# Patient Record
Sex: FEMALE | Race: BLACK OR AFRICAN AMERICAN | Employment: OTHER | URBAN - METROPOLITAN AREA
[De-identification: names, ages, dates, MRNs, and addresses within clinical notes are randomized per-mention and may not be internally consistent; named-entity substitution may affect disease eponyms.]

---

## 2017-01-23 ENCOUNTER — OFFICE VISIT (OUTPATIENT)
Dept: FAMILY MEDICINE CLINIC | Age: 57
End: 2017-01-23

## 2017-01-23 VITALS
WEIGHT: 198 LBS | OXYGEN SATURATION: 99 % | HEIGHT: 66 IN | TEMPERATURE: 96.5 F | SYSTOLIC BLOOD PRESSURE: 126 MMHG | DIASTOLIC BLOOD PRESSURE: 80 MMHG | RESPIRATION RATE: 16 BRPM | BODY MASS INDEX: 31.82 KG/M2 | HEART RATE: 66 BPM

## 2017-01-23 DIAGNOSIS — F51.01 PRIMARY INSOMNIA: ICD-10-CM

## 2017-01-23 DIAGNOSIS — R73.02 IGT (IMPAIRED GLUCOSE TOLERANCE): ICD-10-CM

## 2017-01-23 DIAGNOSIS — I10 ESSENTIAL HYPERTENSION: Primary | ICD-10-CM

## 2017-01-23 DIAGNOSIS — Z51.81 ENCOUNTER FOR MEDICATION MONITORING: ICD-10-CM

## 2017-01-23 LAB — HBA1C MFR BLD HPLC: 5.4 %

## 2017-01-23 RX ORDER — ALPRAZOLAM 0.25 MG/1
TABLET ORAL
Qty: 30 TAB | Refills: 3 | Status: SHIPPED | OUTPATIENT
Start: 2017-01-23 | End: 2017-08-11 | Stop reason: SDUPTHER

## 2017-01-23 RX ORDER — LEVOFLOXACIN 500 MG/1
TABLET, FILM COATED ORAL
COMMUNITY
Start: 2016-12-13 | End: 2017-01-23 | Stop reason: ALTCHOICE

## 2017-01-23 NOTE — PROGRESS NOTES
Chief Complaint   Patient presents with    Hypertension     follow up       CMP 4/15/2016    Lipid 4/15/2016    A1C 4/15/2016    Mammogram 4/1/2015. Pt states she completed her breast reconstruction on 12/7/2016. Colonoscopy 5/20/2015 -by Dr. Bert Du repeat in 5 years.

## 2017-01-23 NOTE — PROGRESS NOTES
HISTORY OF PRESENT ILLNESS  Vicenta Elizabeth is a 55 y.o. female. HPI   Follow up on chronic medical problems. Follow up on BP. Overall feeling well. Hypertension Review:  Compliant w/ low salt diet, and some exercise. Tolerating Norvasc without problems. No home bp monitoring. No swelling, headache or dizziness. No chest pain, SOB, palpitations. Sleeping better with using the xanax. Glucose intolerance reveiw:  She has IGT. Last A1c level was 5.7% in April 2016. Diabetic ROS - further diabetic ROS: no polyuria or polydipsia, no chest pain, dyspnea or TIA's, no numbness, tingling or pain in extremities, no unusual visual symptoms. Lab review: orders written for new lab studies as appropriate; see orders. Recent b/l mastectomy for hx of breast cancer October 2015. Overall recovering well. Just finished her breast reconstruction on last month. Patient Active Problem List   Diagnosis Code    [facility] C[facility]    Breast cancer genetic susceptibility Z15.01    Hypovitaminosis D E55.9    Osteoporosis M81.0    Hypertension I10    Insomnia G47.00    HX: breast cancer Z85.3    Encounter for medication monitoring Z51.81    IGT (impaired glucose tolerance) R73.02       Current Outpatient Prescriptions   Medication Sig Dispense Refill    iron-vitamin C (VITRON-C) 65 mg iron- 125 mg TbEC Take 1 Tab by mouth daily.  docusate sodium (COLACE) 100 mg capsule TAKE 1 CAPSULE BY MOUTH TWICE A DAY AS NEEDED FOR CONSTIPATION  0    ALPRAZolam (XANAX) 0.25 mg tablet TAKE 1 TABLET BY MOUTH AT BEDTIME AS NEEDED FOR SLEEP 30 Tab 3    amLODIPine (NORVASC) 2.5 mg tablet TAKE 1 TABLET BY MOUTH EVERY DAY 30 Tab 11    acetaminophen (TYLENOL EXTRA STRENGTH) 500 mg tablet Take 1,000 mg by mouth every six (6) hours as needed for Pain.  mometasone (NASONEX) 50 mcg/actuation nasal spray 2 Sprays by Both Nostrils route daily. 1 Container 11    OTHER 5 mL daily.  Vitamin D 1,000 IU liquid daily  LORATADINE/PSEUDOEPHEDRINE SUL (CLARITIN-D 24 HOUR PO) Take 1 Tab by mouth as needed.  Calcium Carbonate-Vit D3-Min (CALTRATE 600+D PLUS MINERALS) 600-400 mg-unit Tab Take 1 Tab by mouth daily. Allergies   Allergen Reactions    Fentanyl Nausea Only    Neulasta [Pegfilgrastim] Other (comments)     Severe pain    Reglan [Metoclopramide] Other (comments)     Muscle tension    Sulfa (Sulfonamide Antibiotics) Nausea Only    Zofran [Ondansetron Hcl (Pf)] Itching       Past Medical History   Diagnosis Date    Breast cancer genetic susceptibility     Cancer [facility]) [facility]     right breast    Cancer ([facility] [address] 1994     reoccurence in the lymph nodes.  Cancer [facility]) 2008     left breast    Chemotherapy      2008 and 1994    Hypertension        Past Surgical History   Procedure Laterality Date    Hc stem cell trnspl autologous  1994    Endoscopy, colon, diagnostic  4/10     manetas. repeat in5 yrs.     Hx oophorectomy  1/28/2013    Hx breast lumpectomy  1994     right    Hx breast lumpectomy  2008     left breast    Hx mastectomy  10/5/2015     Bilateral    Hx breast reconstruction  10/5/2015    Hx breast reconstruction  12/07/2016     completed       Family History   Problem Relation Age of Onset    Heart Disease Mother     Hypertension Mother     No Known Problems Father     Diabetes Maternal Grandmother     No Known Problems Maternal Grandfather     No Known Problems Paternal Grandmother     No Known Problems Paternal Grandfather        Social History   Substance Use Topics    Smoking status: Never Smoker    Smokeless tobacco: Never Used    Alcohol use No        Lab Results  Component Value Date/Time   WBC 4.9 01/16/2015 11:10 AM   HGB 13.2 01/16/2015 11:10 AM   HCT 39.1 01/16/2015 11:10 AM   PLATELET 041 25/29/3294 11:10 AM   MCV 90 01/16/2015 11:10 AM       Lab Results  Component Value Date/Time   Cholesterol, total 206 04/15/2016 11:53 AM   HDL Cholesterol 104 04/15/2016 11:53 AM LDL, calculated 86 04/15/2016 11:53 AM   Triglyceride 80 04/15/2016 11:53 AM       Lab Results   Component Value Date/Time    Sodium 141 04/15/2016 11:53 AM    Potassium 4.6 04/15/2016 11:53 AM    Chloride 99 04/15/2016 11:53 AM    CO2 26 04/15/2016 11:53 AM    Glucose 82 04/15/2016 11:53 AM    BUN 16 04/15/2016 11:53 AM    Creatinine 0.66 04/15/2016 11:53 AM    BUN/Creatinine ratio 24 04/15/2016 11:53 AM    GFR est  04/15/2016 11:53 AM    GFR est non- 04/15/2016 11:53 AM    Calcium 9.6 04/15/2016 11:53 AM    Bilirubin, total 0.3 04/15/2016 11:53 AM    ALT 7 04/15/2016 11:53 AM    AST 12 04/15/2016 11:53 AM    Alk. phosphatase 84 04/15/2016 11:53 AM    Protein, total 7.4 04/15/2016 11:53 AM    Albumin 4.4 04/15/2016 11:53 AM    A-G Ratio 1.5 04/15/2016 11:53 AM      Lab Results   Component Value Date/Time    Hemoglobin A1c 5.9 02/25/2014 09:06 AM    Hemoglobin A1c (POC) 5.7 04/15/2016 11:53 AM         Review of Systems   Constitutional: Negative for malaise/fatigue. HENT: Negative for congestion. Eyes: Negative for blurred vision. Respiratory: Negative for cough and shortness of breath. Cardiovascular: Negative for chest pain, palpitations and leg swelling. Gastrointestinal: Negative for abdominal pain, constipation and heartburn. Genitourinary: Negative for dysuria, frequency and urgency. Musculoskeletal: Negative for back pain and joint pain. Neurological: Negative for dizziness, tingling and headaches. Endo/Heme/Allergies: Negative for environmental allergies. Psychiatric/Behavioral: Negative for depression. The patient does not have insomnia. Physical Exam   Constitutional: She appears well-developed and well-nourished.    /80 (BP 1 Location: Left arm, BP Patient Position: Sitting)  Pulse 66  Temp 96.5 °F (35.8 °C) (Oral)   Resp 16  Ht 5' 6\" (1.676 m)  Wt 198 lb (89.8 kg)  LMP 01/01/1994  SpO2 99%  BMI 31.96 kg/m2     HENT:   Right Ear: Tympanic membrane and ear canal normal.   Left Ear: Tympanic membrane and ear canal normal.   Nose: No mucosal edema or rhinorrhea. Mouth/Throat: Oropharynx is clear and moist and mucous membranes are normal.   Neck: Normal range of motion. Neck supple. No thyromegaly present. Cardiovascular: Normal rate and regular rhythm. No murmur heard. Pulmonary/Chest: Effort normal and breath sounds normal.   Abdominal: Soft. Bowel sounds are normal. There is no tenderness. Musculoskeletal: Normal range of motion. She exhibits no edema. Lymphadenopathy:     She has no cervical adenopathy. Skin: Skin is warm and dry. Psychiatric: She has a normal mood and affect. Nursing note and vitals reviewed. ASSESSMENT and PLAN  Becky Felton was seen today for hypertension. Diagnoses and all orders for this visit:    Essential hypertension  Stable     Primary insomnia  -     Refill ALPRAZolam (XANAX) 0.25 mg tablet; TAKE 1 TABLET BY MOUTH AT BEDTIME AS NEEDED FOR SLEEP    IGT (impaired glucose tolerance)  -     AMB POC HEMOGLOBIN A1C    Encounter for medication monitoring  -     METABOLIC PANEL, BASIC  -     AMB POC COMPLETE CBC, AUTOMATED      Follow-up Disposition:  Return in about 6 months (around 7/23/2017). reviewed diet, exercise and weight control  cardiovascular risk and specific lipid/LDL goals reviewed  reviewed medications and side effects in detail  specific diabetic recommendations: low cholesterol diet, weight control and daily exercise discussed and glycohemoglobin and other lab monitoring discussed     I have discussed diagnosis listed in this note with pt and/or family. I have discussed treatment plans and options and the risk/benefit analysis of those options, including safe use of medications and possible medication side effects. Through the use of shared decision making we have agreed to the above plan. The patient has received an after-visit summary and questions were answered concerning future plans and follow up.   Advise pt of any urgent changes then to proceed to the ER.

## 2017-01-23 NOTE — MR AVS SNAPSHOT
Visit Information Date & Time Provider Department Dept. Phone Encounter #  
 1/23/2017  2:00 PM Marcin Julian MD [facility] [phone] [id number] Follow-up Instructions Return in about 6 months (around 7/23/2017). Your Appointments 4/24/2017  9:00 AM  
ROUTINE CARE with Marcin Julian MD  
[facility] Appt Note: 6mo f/u; [address] [id number] [phone] [address] [facility] [address] Upcoming Health Maintenance Date Due  
 BREAST CANCER SCRN MAMMOGRAM 4/1/2017 Pneumococcal 19-64 Highest Risk (2 of 3 - PCV13) 10/17/2017 PAP AKA CERVICAL CYTOLOGY 9/14/2018 COLONOSCOPY 5/20/2020 DTaP/Tdap/Td series (2 - Td) 5/20/2023 Allergies as of 1/23/2017  Review Complete On: 1/23/2017 By: Marcin Julian MD  
  
 Severity Noted Reaction Type Reactions Fentanyl  01/19/2011    Nausea Only Neulasta [Pegfilgrastim]  01/19/2011    Other (comments) Severe pain Reglan [Metoclopramide]  01/19/2011    Other (comments) Muscle tension Sulfa (Sulfonamide Antibiotics)  01/19/2011    Nausea Only Zofran [Ondansetron Hcl (Pf)]  01/19/2011    Itching Current Immunizations  Reviewed on 1/23/2017 Name Date Influenza Vaccine 10/25/2013 Influenza Vaccine (Quad) PF 10/17/2016, 12/15/2015 Tdap 5/20/2013 Reviewed by Marcin Julian MD on 1/23/2017 at  2:17 PM  
You Were Diagnosed With   
  
 Codes Comments Essential hypertension    -  Primary ICD-10-CM: I10 
ICD-9-CM: 401.9 Primary insomnia     ICD-10-CM: F51.01 
ICD-9-CM: 307.42 Encounter for medication monitoring     ICD-10-CM: Z51.81 
ICD-9-CM: V58.83 IGT (impaired glucose tolerance)     ICD-10-CM: R73.02 
ICD-9-CM: 790.22 Vitals BP Pulse Temp Resp Height(growth percentile) Weight(growth percentile) 126/80 (BP 1 Location: Left arm, BP Patient Position: Sitting) 66 96.5 °F (35.8 °C) (Oral) 16 5' 6\" (1.676 m) 198 lb (89.8 kg) LMP SpO2 BMI OB Status Smoking Status 01/01/1994 99% 31.96 kg/m2 Postmenopausal Never Smoker BMI and BSA Data Body Mass Index Body Surface Area 31.96 kg/m 2 2.04 m 2 Preferred Pharmacy Pharmacy Name Phone [id number], [place] - [address] [phone] Your Updated Medication List  
  
   
This list is accurate as of: 1/23/17  2:23 PM.  Always use your most recent med list.  
  
  
  
  
 ALPRAZolam 0.25 mg tablet Commonly known as:  XANAX  
TAKE 1 TABLET BY MOUTH AT BEDTIME AS NEEDED FOR SLEEP  
  
 amLODIPine 2.5 mg tablet Commonly known as:  Carole Colon TAKE 1 TABLET BY MOUTH EVERY DAY  
  
 CALTRATE 600+D PLUS MINERALS 600 mg calcium- 400 unit Tab Generic drug:  Calcium Carbonate-Vit D3-Min Take 1 Tab by mouth daily. CLARITIN-D 24 HOUR PO Take 1 Tab by mouth as needed. docusate sodium 100 mg capsule Commonly known as:  COLACE  
TAKE 1 CAPSULE BY MOUTH TWICE A DAY AS NEEDED FOR CONSTIPATION  
  
 mometasone 50 mcg/actuation nasal spray Commonly known as:  NASONEX  
2 Sprays by Both Nostrils route daily. OTHER  
5 mL daily. Vitamin D 1,000 IU liquid daily TYLENOL EXTRA STRENGTH 500 mg tablet Generic drug:  acetaminophen Take 1,000 mg by mouth every six (6) hours as needed for Pain. VITRON-C 65 mg iron- 125 mg Tbec Generic drug:  iron-vitamin C Take 1 Tab by mouth daily. Prescriptions Printed Refills ALPRAZolam (XANAX) 0.25 mg tablet 3 Sig: TAKE 1 TABLET BY MOUTH AT BEDTIME AS NEEDED FOR SLEEP Class: Print We Performed the Following AMB POC COMPLETE CBC, AUTOMATED [43336 CPT(R)] AMB POC HEMOGLOBIN A1C [19513 CPT(R)] METABOLIC PANEL, BASIC [64231 CPT(R)] Follow-up Instructions Return in about 6 months (around 7/23/2017). Introducing [facility]! Steph Zuniga introduces [employer] patient portal. Now you can access parts of your medical record, email your doctor's office, and request medication refills online. 1. In your internet browser, go to [email or web address] 2. Click on the First Time User? Click Here link in the Sign In box. You will see the New Member Sign Up page. 3. Enter your [employer] Access Code exactly as it appears below. You will not need to use this code after youve completed the sign-up process. If you do not sign up before the expiration date, you must request a new code. · [employer] Access Code: [email or web address] Expires: 4/23/2017  2:23 PM 
 
4. Enter the last four digits of your Social Security Number (xxxx) and Date of Birth (mm/dd/yyyy) as indicated and click Submit. You will be taken to the next sign-up page. 5. Create a [employer] ID. This will be your [employer] login ID and cannot be changed, so think of one that is secure and easy to remember. 6. Create a [employer] password. You can change your password at any time. 7. Enter your Password Reset Question and Answer. This can be used at a later time if you forget your password. 8. Enter your e-mail address. You will receive e-mail notification when new information is available in [address] 9. Click Sign Up. You can now view and download portions of your medical record. 10. Click the Download Summary menu link to download a portable copy of your medical information. If you have questions, please visit the Frequently Asked Questions section of the [employer] website. Remember, [employer] is NOT to be used for urgent needs. For medical emergencies, dial 911. Now available from your iPhone and Android! Please provide this summary of care documentation to your next provider. Your primary care clinician is listed as Janny Hess.  If you have any questions after today's visit, please call [phone].

## 2017-01-24 LAB
BUN SERPL-MCNC: 12 MG/DL (ref 6–24)
BUN/CREAT SERPL: 16 (ref 9–23)
CALCIUM SERPL-MCNC: 9.4 MG/DL (ref 8.7–10.2)
CHLORIDE SERPL-SCNC: 99 MMOL/L (ref 96–106)
CO2 SERPL-SCNC: 27 MMOL/L (ref 18–29)
CREAT SERPL-MCNC: 0.73 MG/DL (ref 0.57–1)
GLUCOSE SERPL-MCNC: 90 MG/DL (ref 65–99)
POTASSIUM SERPL-SCNC: 4.5 MMOL/L (ref 3.5–5.2)
SODIUM SERPL-SCNC: 139 MMOL/L (ref 134–144)

## 2017-02-27 RX ORDER — AMLODIPINE BESYLATE 2.5 MG/1
TABLET ORAL
Qty: 30 TAB | Refills: 11 | Status: SHIPPED | OUTPATIENT
Start: 2017-02-27 | End: 2018-02-09 | Stop reason: SDUPTHER

## 2017-07-24 ENCOUNTER — OFFICE VISIT (OUTPATIENT)
Dept: FAMILY MEDICINE CLINIC | Age: 57
End: 2017-07-24

## 2017-07-24 VITALS
OXYGEN SATURATION: 99 % | TEMPERATURE: 97.5 F | DIASTOLIC BLOOD PRESSURE: 84 MMHG | HEIGHT: 66 IN | SYSTOLIC BLOOD PRESSURE: 135 MMHG | HEART RATE: 63 BPM | BODY MASS INDEX: 31.5 KG/M2 | RESPIRATION RATE: 16 BRPM | WEIGHT: 196 LBS

## 2017-07-24 DIAGNOSIS — M54.32 SCIATICA OF LEFT SIDE: ICD-10-CM

## 2017-07-24 DIAGNOSIS — R73.02 IGT (IMPAIRED GLUCOSE TOLERANCE): ICD-10-CM

## 2017-07-24 DIAGNOSIS — I10 ESSENTIAL HYPERTENSION: Primary | ICD-10-CM

## 2017-07-24 DIAGNOSIS — Z51.81 ENCOUNTER FOR MEDICATION MONITORING: ICD-10-CM

## 2017-07-24 RX ORDER — METRONIDAZOLE 500 MG/1
TABLET ORAL
Refills: 0 | COMMUNITY
Start: 2017-06-01 | End: 2017-07-24 | Stop reason: ALTCHOICE

## 2017-07-24 RX ORDER — PREDNISONE 10 MG/1
TABLET ORAL
Qty: 21 TAB | Refills: 0 | Status: SHIPPED | OUTPATIENT
Start: 2017-07-24 | End: 2017-12-11 | Stop reason: ALTCHOICE

## 2017-07-24 RX ORDER — ACETAMINOPHEN AND CODEINE PHOSPHATE 300; 30 MG/1; MG/1
TABLET ORAL
Refills: 0 | COMMUNITY
Start: 2017-05-19 | End: 2017-07-25

## 2017-07-24 RX ORDER — AMOXICILLIN 500 MG/1
CAPSULE ORAL
Refills: 0 | COMMUNITY
Start: 2017-05-09 | End: 2017-07-24 | Stop reason: ALTCHOICE

## 2017-07-24 NOTE — MR AVS SNAPSHOT
Visit Information Date & Time Provider Department Dept. Phone Encounter #  
 7/24/2017  9:00 AM Andrew Starr [phone] [id number] Follow-up Instructions Return in about 5 months (around 12/13/2017) for physical.  
  
Upcoming Health Maintenance Date Due INFLUENZA AGE 9 TO ADULT 8/1/2017 Pneumococcal 19-64 Highest Risk (2 of 3 - PCV13) 10/17/2017 PAP AKA CERVICAL CYTOLOGY 9/14/2018 COLONOSCOPY 5/20/2020 DTaP/Tdap/Td series (2 - Td) 5/20/2023 Allergies as of 7/24/2017  Review Complete On: 7/24/2017 By: Chari Lehman LPN Severity Noted Reaction Type Reactions Fentanyl  01/19/2011    Nausea Only Neulasta [Pegfilgrastim]  01/19/2011    Other (comments) Severe pain Percocet [Oxycodone-acetaminophen]  07/24/2017    Other (comments) Hallucinations Reglan [Metoclopramide]  01/19/2011    Other (comments) Muscle tension Sulfa (Sulfonamide Antibiotics)  01/19/2011    Nausea Only Zofran [Ondansetron Hcl (Pf)]  01/19/2011    Itching Current Immunizations  Reviewed on 1/23/2017 Name Date Influenza Vaccine 10/25/2013 Influenza Vaccine (Quad) PF 10/17/2016, 12/15/2015 Tdap 5/20/2013 Not reviewed this visit You Were Diagnosed With   
  
 Codes Comments Essential hypertension    -  Primary ICD-10-CM: I10 
ICD-9-CM: 401.9 IGT (impaired glucose tolerance)     ICD-10-CM: R73.02 
ICD-9-CM: 790.22 Encounter for medication monitoring     ICD-10-CM: Z51.81 
ICD-9-CM: V58.83 Sciatica of left side     ICD-10-CM: M54.32 
ICD-9-CM: 724.3 Vitals BP Pulse Temp Resp Height(growth percentile) Weight(growth percentile) 135/84 (BP 1 Location: Left arm, BP Patient Position: Sitting) 63 97.5 °F (36.4 °C) (Oral) 16 5' 6\" (1.676 m) 196 lb (88.9 kg) LMP SpO2 BMI OB Status Smoking Status 01/01/1994 99% 31.64 kg/m2 Postmenopausal Never Smoker BMI and BSA Data Body Mass Index Body Surface Area  
 31.64 kg/m 2 2.03 m 2 Preferred Pharmacy Pharmacy Name Phone [facility]/PHARMACY #[id number]- [facility] [address] [phone] Your Updated Medication List  
  
   
This list is accurate as of: 7/24/17 10:35 AM.  Always use your most recent med list.  
  
  
  
  
 acetaminophen-codeine 300-30 mg per tablet Commonly known as:  TYLENOL #3  
TAKE 1 TABLET BY MOUTH EVERY 4 TO 6 HOURS AS NEEDED  
  
 ALPRAZolam 0.25 mg tablet Commonly known as:  XANAX  
TAKE 1 TABLET BY MOUTH AT BEDTIME AS NEEDED FOR SLEEP  
  
 amLODIPine 2.5 mg tablet Commonly known as:  Adri Antonina TAKE 1 TABLET BY MOUTH EVERY DAY  
  
 CALTRATE 600+D PLUS MINERALS 600 mg calcium- 400 unit Tab Generic drug:  Calcium Carbonate-Vit D3-Min Take 1 Tab by mouth daily. CLARITIN-D 24 HOUR PO Take 1 Tab by mouth as needed. docusate sodium 100 mg capsule Commonly known as:  COLACE  
TAKE 1 CAPSULE BY MOUTH TWICE A DAY AS NEEDED FOR CONSTIPATION  
  
 mometasone 50 mcg/actuation nasal spray Commonly known as:  NASONEX  
2 Sprays by Both Nostrils route daily. OTHER  
5 mL daily. Vitamin D 1,000 IU liquid daily  
  
 predniSONE 10 mg dose pack Commonly known as:  STERAPRED DS See administration instruction per 10mg dose pack TYLENOL EXTRA STRENGTH 500 mg tablet Generic drug:  acetaminophen Take 1,000 mg by mouth every six (6) hours as needed for Pain. VITRON-C 65 mg iron- 125 mg Tbec Generic drug:  iron,carbonyl-vitamin C Take 1 Tab by mouth daily. Prescriptions Sent to Pharmacy Refills  
 predniSONE (STERAPRED DS) 10 mg dose pack 0 Sig: See administration instruction per 10mg dose pack Class: Normal  
 Pharmacy: [facility]/pharmacy [address] [facility] [address] Ph #: [phone] We Performed the Following LIPID PANEL [41757 CPT(R)] METABOLIC PANEL, BASIC [52737 CPT(R)] Follow-up Instructions Return in about 5 months (around 12/13/2017) for physical.  
  
To-Do List   
 07/24/2017 Imaging:  XR SPINE LUMB 2 OR 3 V Patient Instructions Sciatica: Care Instructions Your Care Instructions Sciatica (say \"axc-GW-ro-kuh\") is an irritation of one of the sciatic nerves, which come from the spinal cord in the lower back. The sciatic nerves and their branches extend down through the buttock to the foot. Sciatica can develop when an injured disc in the back presses against a spinal nerve root. Its main symptom is pain, numbness, or weakness that is often worse in the leg or foot than in the back. Sciatica often will improve and go away with time. Early treatment usually includes medicines and exercises to relieve pain. Follow-up care is a key part of your treatment and safety. Be sure to make and go to all appointments, and call your doctor if you are having problems. It's also a good idea to know your test results and keep a list of the medicines you take. How can you care for yourself at home? · Take pain medicines exactly as directed. ¨ If the doctor gave you a prescription medicine for pain, take it as prescribed. ¨ If you are not taking a prescription pain medicine, ask your doctor if you can take an over-the-counter medicine. · Use heat or ice to relieve pain. ¨ To apply heat, put a warm water bottle, heating pad set on low, or warm cloth on your back. Do not go to sleep with a heating pad on your skin. ¨ To use ice, put ice or a cold pack on the area for 10 to 20 minutes at a time. Put a thin cloth between the ice and your skin. · Avoid sitting if possible, unless it feels better than standing. · Alternate lying down with short walks. Increase your walking distance as you are able to without making your symptoms worse. · Do not do anything that makes your symptoms worse. When should you call for help? Call 911 anytime you think you may need emergency care. For example, call if: 
· You are unable to move a leg at all. Call your doctor now or seek immediate medical care if: 
· You have new or worse symptoms in your legs or buttocks. Symptoms may include: ¨ Numbness or tingling. ¨ Weakness. ¨ Pain. · You lose bladder or bowel control. Watch closely for changes in your health, and be sure to contact your doctor if: 
· You are not getting better as expected. Where can you learn more? Go to http://xavi-gregory.info/. Enter [phone] in the search box to learn more about \"Sciatica: Care Instructions. \" Current as of: March 21, 2017 Content Version: 11.3 © 6903-4245 Loop App. Care instructions adapted under license by [employer] (which disclaims liability or warranty for this information). If you have questions about a medical condition or this instruction, always ask your healthcare professional. [place] [address] any warranty or liability for your use of this information. Low Back Pain: Exercises Your Care Instructions Here are some examples of typical rehabilitation exercises for your condition. Start each exercise slowly. Ease off the exercise if you start to have pain. Your doctor or physical therapist will tell you when you can start these exercises and which ones will work best for you. How to do the exercises Press-up 1. Lie on your stomach, supporting your body with your forearms. 2. Press your elbows down into the floor to raise your upper back. As you do this, relax your stomach muscles and allow your back to arch without using your back muscles. As your press up, do not let your hips or pelvis come off the floor. 3. Hold for 15 to 30 seconds, then relax. 4. Repeat 2 to 4 times. Alternate arm and leg (bird dog) exercise Note: Do this exercise slowly.  Try to keep your body straight at all times, and do not let one hip drop lower than the other. 1. Start on the floor, on your hands and knees. 2. Tighten your belly muscles. 3. Raise one leg off the floor, and hold it straight out behind you. Be careful not to let your hip drop down, because that will twist your trunk. 4. Hold for about 6 seconds, then lower your leg and switch to the other leg. 5. Repeat 8 to 12 times on each leg. 6. Over time, work up to holding for 10 to 30 seconds each time. 7. If you feel stable and secure with your leg raised, try raising the opposite arm straight out in front of you at the same time. Knee-to-chest exercise 1. Lie on your back with your knees bent and your feet flat on the floor. 2. Bring one knee to your chest, keeping the other foot flat on the floor (or keeping the other leg straight, whichever feels better on your lower back). 3. Keep your lower back pressed to the floor. Hold for at least 15 to 30 seconds. 4. Relax, and lower the knee to the starting position. 5. Repeat with the other leg. Repeat 2 to 4 times with each leg. 6. To get more stretch, put your other leg flat on the floor while pulling your knee to your chest. 
Curl-ups 1. Lie on the floor on your back with your knees bent at a 90-degree angle. Your feet should be flat on the floor, about 12 inches from your buttocks. 2. Cross your arms over your chest. If this bothers your neck, try putting your hands behind your neck (not your head), with your elbows spread apart. 3. Slowly tighten your belly muscles and raise your shoulder blades off the floor. 4. Keep your head in line with your body, and do not press your chin to your chest. 
5. Hold this position for 1 or 2 seconds, then slowly lower yourself back down to the floor. 6. Repeat 8 to 12 times. Pelvic tilt exercise 1. Lie on your back with your knees bent. 2. \"Brace\" your stomach.  This means to tighten your muscles by pulling in and imagining your belly button moving toward your spine. You should feel like your back is pressing to the floor and your hips and pelvis are rocking back. 3. Hold for about 6 seconds while you breathe smoothly. 4. Repeat 8 to 12 times. Heel dig bridging 1. Lie on your back with both knees bent and your ankles bent so that only your heels are digging into the floor. Your knees should be bent about 90 degrees. 2. Then push your heels into the floor, squeeze your buttocks, and lift your hips off the floor until your shoulders, hips, and knees are all in a straight line. 3. Hold for about 6 seconds as you continue to breathe normally, and then slowly lower your hips back down to the floor and rest for up to 10 seconds. 4. Do 8 to 12 repetitions. Hamstring stretch in doorway 1. Lie on your back in a doorway, with one leg through the open door. 2. Slide your leg up the wall to straighten your knee. You should feel a gentle stretch down the back of your leg. 3. Hold the stretch for at least 15 to 30 seconds. Do not arch your back, point your toes, or bend either knee. Keep one heel touching the floor and the other heel touching the wall. 4. Repeat with your other leg. 5. Do 2 to 4 times for each leg. Hip flexor stretch 1. Kneel on the floor with one knee bent and one leg behind you. Place your forward knee over your foot. Keep your other knee touching the floor. 2. Slowly push your hips forward until you feel a stretch in the upper thigh of your rear leg. 3. Hold the stretch for at least 15 to 30 seconds. Repeat with your other leg. 4. Do 2 to 4 times on each side. Wall sit 1. Stand with your back 10 to 12 inches away from a wall. 2. Lean into the wall until your back is flat against it. 3. Slowly slide down until your knees are slightly bent, pressing your lower back into the wall. 4. Hold for about 6 seconds, then slide back up the wall. 5. Repeat 8 to 12 times. Follow-up care is a key part of your treatment and safety. Be sure to make and go to all appointments, and call your doctor if you are having problems. It's also a good idea to know your test results and keep a list of the medicines you take. Where can you learn more? Go to http://xavi-gregory.info/. Enter E344 in the search box to learn more about \"Low Back Pain: Exercises. \" Current as of: March 21, 2017 Content Version: 11.3 © 4761-0328 [employer]. Care instructions adapted under license by [employer] (which disclaims liability or warranty for this information). If you have questions about a medical condition or this instruction, always ask your healthcare professional. [address] any warranty or liability for your use of this information. Introducing [facility]! Sofia Severino introduces Simple patient portal. Now you can access parts of your medical record, email your doctor's office, and request medication refills online. 1. In your internet browser, go to [email or web address] 2. Click on the First Time User? Click Here link in the Sign In box. You will see the New Member Sign Up page. 3. Enter your Simple Access Code exactly as it appears below. You will not need to use this code after youve completed the sign-up process. If you do not sign up before the expiration date, you must request a new code. · Simple Access Code: [email or web address] Expires: 10/22/2017 10:35 AM 
 
4. Enter the last four digits of your Social Security Number (xxxx) and Date of Birth (mm/dd/yyyy) as indicated and click Submit. You will be taken to the next sign-up page. 5. Create a Kontront ID. This will be your Simple login ID and cannot be changed, so think of one that is secure and easy to remember. 6. Create a Kontront password. You can change your password at any time. 7. Enter your Password Reset Question and Answer. This can be used at a later time if you forget your password. 8. Enter your e-mail address. You will receive e-mail notification when new information is available in [address] 9. Click Sign Up. You can now view and download portions of your medical record. 10. Click the Download Summary menu link to download a portable copy of your medical information. If you have questions, please visit the Frequently Asked Questions section of the BufferBox website. Remember, BufferBox is NOT to be used for urgent needs. For medical emergencies, dial 911. Now available from your iPhone and Android! Please provide this summary of care documentation to your next provider. Your primary care clinician is listed as Lennox Means. If you have any questions after today's visit, please call [phone].

## 2017-07-24 NOTE — PATIENT INSTRUCTIONS
Sciatica: Care Instructions  Your Care Instructions    Sciatica (say \"qvy-OK-fp-kuh\") is an irritation of one of the sciatic nerves, which come from the spinal cord in the lower back. The sciatic nerves and their branches extend down through the buttock to the foot. Sciatica can develop when an injured disc in the back presses against a spinal nerve root. Its main symptom is pain, numbness, or weakness that is often worse in the leg or foot than in the back. Sciatica often will improve and go away with time. Early treatment usually includes medicines and exercises to relieve pain. Follow-up care is a key part of your treatment and safety. Be sure to make and go to all appointments, and call your doctor if you are having problems. It's also a good idea to know your test results and keep a list of the medicines you take. How can you care for yourself at home? · Take pain medicines exactly as directed. ¨ If the doctor gave you a prescription medicine for pain, take it as prescribed. ¨ If you are not taking a prescription pain medicine, ask your doctor if you can take an over-the-counter medicine. · Use heat or ice to relieve pain. ¨ To apply heat, put a warm water bottle, heating pad set on low, or warm cloth on your back. Do not go to sleep with a heating pad on your skin. ¨ To use ice, put ice or a cold pack on the area for 10 to 20 minutes at a time. Put a thin cloth between the ice and your skin. · Avoid sitting if possible, unless it feels better than standing. · Alternate lying down with short walks. Increase your walking distance as you are able to without making your symptoms worse. · Do not do anything that makes your symptoms worse. When should you call for help? Call 911 anytime you think you may need emergency care. For example, call if:  · You are unable to move a leg at all.   Call your doctor now or seek immediate medical care if:  · You have new or worse symptoms in your legs or buttocks. Symptoms may include:  ¨ Numbness or tingling. ¨ Weakness. ¨ Pain. · You lose bladder or bowel control. Watch closely for changes in your health, and be sure to contact your doctor if:  · You are not getting better as expected. Where can you learn more? Go to http://xavi-gregory.info/. Enter [phone] in the search box to learn more about \"Sciatica: Care Instructions. \"  Current as of: March 21, 2017  Content Version: 11.3  © 9394-0285 [employer]. Care instructions adapted under license by [employer] (which disclaims liability or warranty for this information). If you have questions about a medical condition or this instruction, always ask your healthcare professional. [address] any warranty or liability for your use of this information. Low Back Pain: Exercises  Your Care Instructions  Here are some examples of typical rehabilitation exercises for your condition. Start each exercise slowly. Ease off the exercise if you start to have pain. Your doctor or physical therapist will tell you when you can start these exercises and which ones will work best for you. How to do the exercises  Press-up    1. Lie on your stomach, supporting your body with your forearms. 2. Press your elbows down into the floor to raise your upper back. As you do this, relax your stomach muscles and allow your back to arch without using your back muscles. As your press up, do not let your hips or pelvis come off the floor. 3. Hold for 15 to 30 seconds, then relax. 4. Repeat 2 to 4 times. Alternate arm and leg (bird dog) exercise    Note: Do this exercise slowly. Try to keep your body straight at all times, and do not let one hip drop lower than the other. 1. Start on the floor, on your hands and knees. 2. Tighten your belly muscles. 3. Raise one leg off the floor, and hold it straight out behind you.  Be careful not to let your hip drop down, because that will twist your trunk. 4. Hold for about 6 seconds, then lower your leg and switch to the other leg. 5. Repeat 8 to 12 times on each leg. 6. Over time, work up to holding for 10 to 30 seconds each time. 7. If you feel stable and secure with your leg raised, try raising the opposite arm straight out in front of you at the same time. Knee-to-chest exercise    1. Lie on your back with your knees bent and your feet flat on the floor. 2. Bring one knee to your chest, keeping the other foot flat on the floor (or keeping the other leg straight, whichever feels better on your lower back). 3. Keep your lower back pressed to the floor. Hold for at least 15 to 30 seconds. 4. Relax, and lower the knee to the starting position. 5. Repeat with the other leg. Repeat 2 to 4 times with each leg. 6. To get more stretch, put your other leg flat on the floor while pulling your knee to your chest.  Curl-ups    1. Lie on the floor on your back with your knees bent at a 90-degree angle. Your feet should be flat on the floor, about 12 inches from your buttocks. 2. Cross your arms over your chest. If this bothers your neck, try putting your hands behind your neck (not your head), with your elbows spread apart. 3. Slowly tighten your belly muscles and raise your shoulder blades off the floor. 4. Keep your head in line with your body, and do not press your chin to your chest.  5. Hold this position for 1 or 2 seconds, then slowly lower yourself back down to the floor. 6. Repeat 8 to 12 times. Pelvic tilt exercise    1. Lie on your back with your knees bent. 2. \"Brace\" your stomach. This means to tighten your muscles by pulling in and imagining your belly button moving toward your spine. You should feel like your back is pressing to the floor and your hips and pelvis are rocking back. 3. Hold for about 6 seconds while you breathe smoothly. 4. Repeat 8 to 12 times. Heel dig bridging    1.  Lie on your back with both knees bent and your ankles bent so that only your heels are digging into the floor. Your knees should be bent about 90 degrees. 2. Then push your heels into the floor, squeeze your buttocks, and lift your hips off the floor until your shoulders, hips, and knees are all in a straight line. 3. Hold for about 6 seconds as you continue to breathe normally, and then slowly lower your hips back down to the floor and rest for up to 10 seconds. 4. Do 8 to 12 repetitions. Hamstring stretch in doorway    1. Lie on your back in a doorway, with one leg through the open door. 2. Slide your leg up the wall to straighten your knee. You should feel a gentle stretch down the back of your leg. 3. Hold the stretch for at least 15 to 30 seconds. Do not arch your back, point your toes, or bend either knee. Keep one heel touching the floor and the other heel touching the wall. 4. Repeat with your other leg. 5. Do 2 to 4 times for each leg. Hip flexor stretch    1. Kneel on the floor with one knee bent and one leg behind you. Place your forward knee over your foot. Keep your other knee touching the floor. 2. Slowly push your hips forward until you feel a stretch in the upper thigh of your rear leg. 3. Hold the stretch for at least 15 to 30 seconds. Repeat with your other leg. 4. Do 2 to 4 times on each side. Wall sit    1. Stand with your back 10 to 12 inches away from a wall. 2. Lean into the wall until your back is flat against it. 3. Slowly slide down until your knees are slightly bent, pressing your lower back into the wall. 4. Hold for about 6 seconds, then slide back up the wall. 5. Repeat 8 to 12 times. Follow-up care is a key part of your treatment and safety. Be sure to make and go to all appointments, and call your doctor if you are having problems. It's also a good idea to know your test results and keep a list of the medicines you take. Where can you learn more?   Go to http://xavi-gregory.info/. Enter Y491 in the search box to learn more about \"Low Back Pain: Exercises. \"  Current as of: March 21, 2017  Content Version: 11.3  © 7317-0324 [employer]. Care instructions adapted under license by [employer] (which disclaims liability or warranty for this information). If you have questions about a medical condition or this instruction, always ask your healthcare professional. [place] [address] any warranty or liability for your use of this information.

## 2017-07-24 NOTE — PROGRESS NOTES
HISTORY OF PRESENT ILLNESS  Linda Zhu is a 65 y.o. female. HPI   Follow up on chronic medical problems. Follow up on BP. Overall feeling well. C/o low back pain for the past few weeks. Sx comes and goes. Pain radiates down the left leg. No injury noted. No previous hx of back problems noted. Pain is 5-6/10 when at its worse. Has only been taking occassional aleve for the pain which does help. Hypertension Review:  Compliant w/ low salt diet, and some exercise. Tolerating Norvasc without problems. No home bp monitoring. No swelling, headache or dizziness. No chest pain, SOB, palpitations. Sleeping better with using the xanax. Patient Active Problem List   Diagnosis Code    [facility]    Breast cancer genetic susceptibility Z15.01    Hypovitaminosis D E55.9    Osteoporosis M81.0    Hypertension I10    Insomnia G47.00    HX: breast cancer Z85.3    Encounter for medication monitoring Z51.81    IGT (impaired glucose tolerance) R73.02       Current Outpatient Prescriptions   Medication Sig Dispense Refill    amLODIPine (NORVASC) 2.5 mg tablet TAKE 1 TABLET BY MOUTH EVERY DAY 30 Tab 11    iron-vitamin C (VITRON-C) 65 mg iron- 125 mg TbEC Take 1 Tab by mouth daily.  ALPRAZolam (XANAX) 0.25 mg tablet TAKE 1 TABLET BY MOUTH AT BEDTIME AS NEEDED FOR SLEEP 30 Tab 3    docusate sodium (COLACE) 100 mg capsule TAKE 1 CAPSULE BY MOUTH TWICE A DAY AS NEEDED FOR CONSTIPATION  0    acetaminophen (TYLENOL EXTRA STRENGTH) 500 mg tablet Take 1,000 mg by mouth every six (6) hours as needed for Pain.  mometasone (NASONEX) 50 mcg/actuation nasal spray 2 Sprays by Both Nostrils route daily. 1 Container 11    OTHER 5 mL daily. Vitamin D 1,000 IU liquid daily      LORATADINE/PSEUDOEPHEDRINE SUL (CLARITIN-D 24 HOUR PO) Take 1 Tab by mouth as needed.  Calcium Carbonate-Vit D3-Min (CALTRATE 600+D PLUS MINERALS) 600-400 mg-unit Tab Take 1 Tab by mouth daily.          Allergies Allergen Reactions    Fentanyl Nausea Only    Neulasta [Pegfilgrastim] Other (comments)     Severe pain    Reglan [Metoclopramide] Other (comments)     Muscle tension    Sulfa (Sulfonamide Antibiotics) Nausea Only    Zofran [Ondansetron Hcl (Pf)] Itching         Past Medical History:   Diagnosis Date    Breast cancer genetic susceptibility     [facility] ([facility] 1991    right breast    Cancer ([facility] 1994    reoccurence in the lymph nodes.  Cancer [facility]) 2008    left breast    Chemotherapy     2008 and 1994    Hypertension          Past Surgical History:   Procedure Laterality Date    ENDOSCOPY, COLON, DIAGNOSTIC  4/10    manetas. repeat in5 yrs.     HC STEM CELL TRNSPL AUTOLOGOUS  1994    HX BREAST LUMPECTOMY  1994    right    HX BREAST LUMPECTOMY  2008    left breast    HX BREAST RECONSTRUCTION  10/5/2015    HX BREAST RECONSTRUCTION  12/07/2016    completed    HX MASTECTOMY  10/5/2015    Bilateral    HX OOPHORECTOMY  1/28/2013         Family History   Problem Relation Age of Onset    Heart Disease Mother     Hypertension Mother     No Known Problems Father     Diabetes Maternal Grandmother     No Known Problems Maternal Grandfather     No Known Problems Paternal Grandmother     No Known Problems Paternal Grandfather        Social History   Substance Use Topics    Smoking status: Never Smoker    Smokeless tobacco: Never Used    Alcohol use No        Lab Results   Component Value Date/Time    WBC 4.9 01/16/2015 11:10 AM    HGB 13.2 01/16/2015 11:10 AM    HCT 39.1 01/16/2015 11:10 AM    PLATELET 167 85/80/7840 11:10 AM    MCV 90 01/16/2015 11:10 AM       Lab Results   Component Value Date/Time    Cholesterol, total 206 04/15/2016 11:53 AM    HDL Cholesterol 104 04/15/2016 11:53 AM    LDL, calculated 86 04/15/2016 11:53 AM    Triglyceride 80 04/15/2016 11:53 AM       Lab Results   Component Value Date/Time    Sodium 139 01/23/2017 02:50 PM    Potassium 4.5 01/23/2017 02:50 PM    Chloride 99 01/23/2017 02:50 PM    CO2 27 01/23/2017 02:50 PM    Glucose 90 01/23/2017 02:50 PM    BUN 12 01/23/2017 02:50 PM    Creatinine 0.73 01/23/2017 02:50 PM    BUN/Creatinine ratio 16 01/23/2017 02:50 PM    GFR est  01/23/2017 02:50 PM    GFR est non-AA 93 01/23/2017 02:50 PM    Calcium 9.4 01/23/2017 02:50 PM    Bilirubin, total 0.3 04/15/2016 11:53 AM    ALT (SGPT) 7 04/15/2016 11:53 AM    AST (SGOT) 12 04/15/2016 11:53 AM    Alk. phosphatase 84 04/15/2016 11:53 AM    Protein, total 7.4 04/15/2016 11:53 AM    Albumin 4.4 04/15/2016 11:53 AM    A-G Ratio 1.5 04/15/2016 11:53 AM      Lab Results   Component Value Date/Time    Hemoglobin A1c 5.9 02/25/2014 09:06 AM    Hemoglobin A1c (POC) 5.4 01/23/2017 02:50 PM         Review of Systems   Constitutional: Negative for malaise/fatigue. HENT: Negative for congestion. Eyes: Negative for blurred vision. Respiratory: Negative for cough and shortness of breath. Cardiovascular: Negative for chest pain, palpitations and leg swelling. Gastrointestinal: Negative for abdominal pain, constipation and heartburn. Genitourinary: Negative for dysuria, frequency and urgency. Musculoskeletal: Negative for joint pain. Neurological: Negative for dizziness, tingling and headaches. Endo/Heme/Allergies: Negative for environmental allergies. Psychiatric/Behavioral: Negative for depression. The patient does not have insomnia. Physical Exam   Constitutional: She appears well-developed and well-nourished. /84 (BP 1 Location: Left arm, BP Patient Position: Sitting)  Pulse 63  Temp 97.5 °F (36.4 °C) (Oral)   Resp 16  Ht 5' 6\" (1.676 m)  Wt 196 lb (88.9 kg)  LMP 01/01/1994  SpO2 99%  BMI 31.64 kg/m2     HENT:   Right Ear: Tympanic membrane and ear canal normal.   Left Ear: Tympanic membrane and ear canal normal.   Nose: No mucosal edema or rhinorrhea.    Mouth/Throat: Oropharynx is clear and moist and mucous membranes are normal.   Neck: Normal range of motion. Neck supple. No thyromegaly present. Cardiovascular: Normal rate and regular rhythm. No murmur heard. Pulmonary/Chest: Effort normal and breath sounds normal.   Abdominal: Soft. Bowel sounds are normal. There is no tenderness. Musculoskeletal: Normal range of motion. She exhibits no edema. Left side lower back tenderness. Neuro normal sensation and motor strength. Lymphadenopathy:     She has no cervical adenopathy. Skin: Skin is warm and dry. Psychiatric: She has a normal mood and affect. Nursing note and vitals reviewed. ASSESSMENT and PLAN  Laverne Richards was seen today for hypertension and back pain. Diagnoses and all orders for this visit:    Essential hypertension  -     LIPID PANEL  Discussed sodium restriction, high k rich diet, maintaining ideal body weight and regular exercise program such as daily walking 30 min perday 4-5 times per week, as physiologic means to achieve blood pressure control.  Medication compliance advised. Sciatica of left side  -     XR SPINE LUMB 2 OR 3 V; Future  -     predniSONE (STERAPRED DS) 10 mg dose pack; See administration instruction per 10mg dose pack  Instructions for exercises given and reviewed with pt. Pt also to use heat to the area 3-4 times a day over the next several days until sx are improved. Encounter for medication monitoring  -     METABOLIC PANEL, BASIC    Other orders  -     CVD REPORT      Follow-up Disposition:  Return in about 5 months (around 12/13/2017) for physical.  reviewed diet, exercise and weight control  cardiovascular risk and specific lipid/LDL goals reviewed  reviewed medications and side effects in detail  specific diabetic recommendations: low cholesterol diet, weight control and daily exercise discussed and glycohemoglobin and other lab monitoring discussed     I have discussed diagnosis listed in this note with pt and/or family.  I have discussed treatment plans and options and the risk/benefit analysis of those options, including safe use of medications and possible medication side effects. Through the use of shared decision making we have agreed to the above plan. The patient has received an after-visit summary and questions were answered concerning future plans and follow up. Advise pt of any urgent changes then to proceed to the ER.

## 2017-07-24 NOTE — PROGRESS NOTES
Chief Complaint   Patient presents with    Hypertension     6 month follow up    Back Pain     left side and pain radiates down left leg x 1 week     Patient states she has been taking Tylenol ES with no relief. BMP 1/23/2017    A1C 1/23/2017    Lipid 4/15/2016      Mammogram 4/1/2015. Pt states she completed her breast reconstruction on 12/7/2016. Colonoscopy 5/20/2015 -by Dr. Naomie Golden repeat in 5 years.

## 2017-07-25 LAB
BUN SERPL-MCNC: 15 MG/DL (ref 6–24)
BUN/CREAT SERPL: 19 (ref 9–23)
CALCIUM SERPL-MCNC: 9.4 MG/DL (ref 8.7–10.2)
CHLORIDE SERPL-SCNC: 100 MMOL/L (ref 96–106)
CHOLEST SERPL-MCNC: 183 MG/DL (ref 100–199)
CO2 SERPL-SCNC: 22 MMOL/L (ref 18–29)
CREAT SERPL-MCNC: 0.8 MG/DL (ref 0.57–1)
GLUCOSE SERPL-MCNC: 73 MG/DL (ref 65–99)
HDLC SERPL-MCNC: 87 MG/DL
INTERPRETATION, 910389: NORMAL
LDLC SERPL CALC-MCNC: 85 MG/DL (ref 0–99)
POTASSIUM SERPL-SCNC: 5.1 MMOL/L (ref 3.5–5.2)
SODIUM SERPL-SCNC: 139 MMOL/L (ref 134–144)
TRIGL SERPL-MCNC: 56 MG/DL (ref 0–149)
VLDLC SERPL CALC-MCNC: 11 MG/DL (ref 5–40)

## 2017-08-11 DIAGNOSIS — F51.01 PRIMARY INSOMNIA: ICD-10-CM

## 2017-08-11 RX ORDER — ALPRAZOLAM 0.25 MG/1
TABLET ORAL
Qty: 30 TAB | Refills: 3 | OUTPATIENT
Start: 2017-08-11 | End: 2018-06-11 | Stop reason: SDUPTHER

## 2017-12-11 ENCOUNTER — OFFICE VISIT (OUTPATIENT)
Dept: FAMILY MEDICINE CLINIC | Age: 57
End: 2017-12-11

## 2017-12-11 VITALS
DIASTOLIC BLOOD PRESSURE: 81 MMHG | WEIGHT: 194 LBS | TEMPERATURE: 97.1 F | SYSTOLIC BLOOD PRESSURE: 135 MMHG | HEART RATE: 58 BPM | BODY MASS INDEX: 31.18 KG/M2 | RESPIRATION RATE: 16 BRPM | HEIGHT: 66 IN | OXYGEN SATURATION: 100 %

## 2017-12-11 DIAGNOSIS — Z00.00 ROUTINE GENERAL MEDICAL EXAMINATION AT A HEALTH CARE FACILITY: Primary | ICD-10-CM

## 2017-12-11 DIAGNOSIS — Z51.81 ENCOUNTER FOR MEDICATION MONITORING: ICD-10-CM

## 2017-12-11 DIAGNOSIS — R73.02 IGT (IMPAIRED GLUCOSE TOLERANCE): ICD-10-CM

## 2017-12-11 DIAGNOSIS — E66.9 NON MORBID OBESITY: ICD-10-CM

## 2017-12-11 DIAGNOSIS — E55.9 HYPOVITAMINOSIS D: ICD-10-CM

## 2017-12-11 DIAGNOSIS — I10 ESSENTIAL HYPERTENSION: ICD-10-CM

## 2017-12-11 DIAGNOSIS — Z90.13 S/P BILATERAL MASTECTOMY: ICD-10-CM

## 2017-12-11 DIAGNOSIS — Z23 ENCOUNTER FOR IMMUNIZATION: ICD-10-CM

## 2017-12-11 LAB
BILIRUB UR QL STRIP: NEGATIVE
GLUCOSE UR-MCNC: NEGATIVE MG/DL
KETONES P FAST UR STRIP-MCNC: NEGATIVE MG/DL
PH UR STRIP: 5.5 [PH] (ref 4.6–8)
PROT UR QL STRIP: NEGATIVE
SP GR UR STRIP: 1.02 (ref 1–1.03)
UA UROBILINOGEN AMB POC: NORMAL (ref 0.2–1)
URINALYSIS CLARITY POC: CLEAR
URINALYSIS COLOR POC: YELLOW
URINE BLOOD POC: NEGATIVE
URINE LEUKOCYTES POC: NORMAL
URINE NITRITES POC: NEGATIVE

## 2017-12-11 RX ORDER — CALCIUM CARBONATE 300MG(750)
1 TABLET,CHEWABLE ORAL DAILY
COMMUNITY
End: 2019-04-15

## 2017-12-11 NOTE — PROGRESS NOTES
Subjective:   65 y.o. female for Well Woman Check. Her gyne and breast care is done elsewhere by her Ob-Gyne physician. Hypertension Review:  Compliant w/ low salt diet, and some exercise. Tolerating Norvasc without problems. No home bp monitoring. No swelling, headache or dizziness. No chest pain, SOB, palpitations. Sleeping better with using the xanax. Patient Active Problem List   Diagnosis Code    [facility]    Breast cancer genetic susceptibility Z15.01    Hypovitaminosis D E55.9    Osteoporosis M81.0    Hypertension I10    Insomnia G47.00    HX: breast cancer Z85.3    Encounter for medication monitoring Z51.81    IGT (impaired glucose tolerance) R73.02    S/P bilateral mastectomy Z90.13       Current Outpatient Prescriptions   Medication Sig Dispense Refill    Cholecalciferol, Vitamin D3, (VITAMIN D3) 1,000 unit chew Take 1 Tab by mouth daily.  CALCIUM CARBONATE/VITAMIN D3 (CALCIUM CHEWABLE PLUS PO) Take 1 Tab by mouth daily.  ALPRAZolam (XANAX) 0.25 mg tablet TAKE 1 TABLET BY MOUTH AT BEDTIME AS NEEDED FOR SLEEP 30 Tab 3    amLODIPine (NORVASC) 2.5 mg tablet TAKE 1 TABLET BY MOUTH EVERY DAY 30 Tab 11    docusate sodium (COLACE) 100 mg capsule TAKE 1 CAPSULE BY MOUTH TWICE A DAY AS NEEDED FOR CONSTIPATION  0    acetaminophen (TYLENOL EXTRA STRENGTH) 500 mg tablet Take 1,000 mg by mouth every six (6) hours as needed for Pain.  mometasone (NASONEX) 50 mcg/actuation nasal spray 2 Sprays by Both Nostrils route daily. 1 Container 11    LORATADINE/PSEUDOEPHEDRINE SUL (CLARITIN-D 24 HOUR PO) Take 1 Tab by mouth daily as needed.          Allergies   Allergen Reactions    Fentanyl Nausea Only    Neulasta [Pegfilgrastim] Other (comments)     Severe pain    Percocet [Oxycodone-Acetaminophen] Other (comments)     Hallucinations    Reglan [Metoclopramide] Other (comments)     Muscle tension    Sulfa (Sulfonamide Antibiotics) Nausea Only    Zofran [Ondansetron Hcl (Pf)] Itching       Past Medical History:   Diagnosis Date    Breast cancer genetic susceptibility     Cancer [facility]) 1991    right breast    Cancer ([address] 1994    reoccurence in the lymph nodes.  Cancer [facility]) 2008    left breast    Chemotherapy     2008 and 1994    Hypertension        Past Surgical History:   Procedure Laterality Date    ENDOSCOPY, COLON, DIAGNOSTIC  4/10    manetas. repeat in5 yrs.     HC STEM CELL TRNSPL AUTOLOGOUS  1994    HX BREAST LUMPECTOMY  1994    right    HX BREAST LUMPECTOMY  2008    left breast    HX BREAST RECONSTRUCTION  10/5/2015    HX BREAST RECONSTRUCTION  12/07/2016    completed    HX MASTECTOMY  10/5/2015    Bilateral    HX OOPHORECTOMY  1/28/2013       Family History   Problem Relation Age of Onset    Heart Disease Mother     Hypertension Mother     No Known Problems Father     Diabetes Maternal Grandmother     No Known Problems Maternal Grandfather     No Known Problems Paternal Grandmother     No Known Problems Paternal Grandfather        Social History   Substance Use Topics    Smoking status: Never Smoker    Smokeless tobacco: Never Used    Alcohol use No     Lab Results  Component Value Date/Time   WBC 4.9 01/16/2015 11:10 AM   HGB 13.2 01/16/2015 11:10 AM   HCT 39.1 01/16/2015 11:10 AM   PLATELET 052 94/34/2775 11:10 AM   MCV 90 01/16/2015 11:10 AM     Lab Results  Component Value Date/Time   Cholesterol, total 183 07/24/2017 10:45 AM   HDL Cholesterol 87 07/24/2017 10:45 AM   LDL, calculated 85 07/24/2017 10:45 AM   Triglyceride 56 07/24/2017 10:45 AM     Lab Results   Component Value Date/Time    Sodium 139 07/24/2017 10:45 AM    Potassium 5.1 07/24/2017 10:45 AM    Chloride 100 07/24/2017 10:45 AM    CO2 22 07/24/2017 10:45 AM    Glucose 73 07/24/2017 10:45 AM    BUN 15 07/24/2017 10:45 AM    Creatinine 0.80 07/24/2017 10:45 AM    BUN/Creatinine ratio 19 07/24/2017 10:45 AM    GFR est AA 95 07/24/2017 10:45 AM    GFR est non-AA 83 07/24/2017 10:45 AM    Calcium 9.4 07/24/2017 10:45 AM    Bilirubin, total 0.3 04/15/2016 11:53 AM    ALT (SGPT) 7 04/15/2016 11:53 AM    AST (SGOT) 12 04/15/2016 11:53 AM    Alk. phosphatase 84 04/15/2016 11:53 AM    Protein, total 7.4 04/15/2016 11:53 AM    Albumin 4.4 04/15/2016 11:53 AM    A-G Ratio 1.5 04/15/2016 11:53 AM      Lab Results   Component Value Date/Time    Hemoglobin A1c 5.9 02/25/2014 09:06 AM    Hemoglobin A1c (POC) 5.4 01/23/2017 02:50 PM      ROS: Feeling generally well. No TIA's or unusual headaches, no dysphagia. No prolonged cough. No dyspnea or chest pain on exertion. No abdominal pain, change in bowel habits, black or bloody stools. No urinary tract symptoms. No new or unusual musculoskeletal symptoms. Objective: The patient appears well, alert, oriented x 3, in no distress. Visit Vitals    /81 (BP 1 Location: Left arm, BP Patient Position: Sitting)    Pulse (!) 58    Temp 97.1 °F (36.2 °C) (Oral)    Resp 16    Ht 5' 6\" (1.676 m)    Wt 194 lb (88 kg)    LMP 01/01/1994    SpO2 100%    BMI 31.31 kg/m2     ENT normal.  Neck supple. No adenopathy or thyromegaly. JUDITH. Lungs are clear, good air entry, no wheezes, rhonchi or rales. S1 and S2 normal, no murmurs, regular rate and rhythm. Abdomen soft without tenderness, guarding, mass or organomegaly. Extremities show no edema, normal peripheral pulses. Neurological is normal, no focal findings. No axillary adenopathy noted. Breast and Pelvic exams are deferred. Assessment/Plan:   Well Woman  lose weight, increase physical activity, follow low fat diet, follow low salt diet, routine labs ordered, call if any problems  Diagnoses and all orders for this visit:    1. Routine general medical examination at a health care facility  -     AMB POC URINALYSIS DIP STICK AUTO W/ MICRO    2.  Essential hypertension  Discussed sodium restriction, high k rich diet, maintaining ideal body weight and regular exercise program such as daily walking 30 min perday 4-5 times per week, as physiologic means to achieve blood pressure control.  Medication compliance advised. 3. IGT (impaired glucose tolerance)  Continue to monitor. Work on diet and exercise. 4. Encounter for medication monitoring  -     METABOLIC PANEL, COMPREHENSIVE  -     CBC W/O DIFF    5. Encounter for immunization  -     [place] IMMUNIZ ADMIN,1 SINGLE/COMB VAC/TOXOID  -     Influenza virus vaccine (QUADRIVALENT PRES FREE SYRINGE) IM (12130)    6. S/P bilateral mastectomy  No mammogram needed. 7. Non morbid obesity  Discussed weight loss options, diet and exercise. Also reviewed health issues related to obesity. Initial goal of weight loss 10% of current weight. Counseled on goal for exercise of eventual goal of 30-60 minutes 5-7 times a week as per AHA guidelines. Decrease carbohydrates (white foods, sweet foods, sweet drinks and alcohol), increase green leafy vegetables and protein (lean meats and beans). Avoid fried foods. Increase water intake. Eat 3-5 small meals daily. Increase physical activity. 8. Hypovitaminosis D  -     VITAMIN D, 25 HYDROXY      Follow-up Disposition:  Return in about 6 months (around 6/11/2018). reviewed diet, exercise and weight control  cardiovascular risk and specific lipid/LDL goals reviewed  reviewed medications and side effects in detail  specific diabetic recommendations: low cholesterol diet, weight control and daily exercise discussed and glycohemoglobin and other lab monitoring discussed     I have discussed diagnosis listed in this note with pt and/or family. I have discussed treatment plans and options and the risk/benefit analysis of those options, including safe use of medications and possible medication side effects. Through the use of shared decision making we have agreed to the above plan. The patient has received an after-visit summary and questions were answered concerning future plans and follow up.   Advise pt of any urgent changes then to proceed to the ER.

## 2017-12-11 NOTE — MR AVS SNAPSHOT
[address] [place] [address] 
[phone] Patient: Cathleen Guillen MRN: [id number] SSZ:[id number] Visit Information Date & Time Provider Department Dept. Phone Encounter #  
 12/11/2017 10:15 AM Georgia Coelho MD [facility] [phone] [id number] Follow-up Instructions Return in about 6 months (around 6/11/2018). Upcoming Health Maintenance Date Due Influenza Age 5 to Adult 8/1/2017 PAP AKA CERVICAL CYTOLOGY 9/14/2018 Pneumococcal 19-64 Highest Risk (3 of 3 - PCV13) 12/11/2018 COLONOSCOPY 5/20/2020 DTaP/Tdap/Td series (2 - Td) 5/20/2023 Allergies as of 12/11/2017  Review Complete On: 12/11/2017 By: Chaz Gregory LPN Severity Noted Reaction Type Reactions Fentanyl  01/19/2011    Nausea Only Neulasta [Pegfilgrastim]  01/19/2011    Other (comments) Severe pain Percocet [Oxycodone-acetaminophen]  07/24/2017    Other (comments) Hallucinations Reglan [Metoclopramide]  01/19/2011    Other (comments) Muscle tension Sulfa (Sulfonamide Antibiotics)  01/19/2011    Nausea Only Zofran [Ondansetron Hcl (Pf)]  01/19/2011    Itching Current Immunizations  Reviewed on 12/11/2017 Name Date Influenza Vaccine 10/25/2013 Influenza Vaccine (Quad) PF 12/11/2017, 10/17/2016, 12/15/2015 Tdap 5/20/2013 Reviewed by Georgia Coelho MD on 12/11/2017 at 11:16 AM  
 Reviewed by Georgia Coelho MD on 12/11/2017 at 11:45 AM  
You Were Diagnosed With   
  
 Codes Comments Routine general medical examination at a health care facility    -  Primary ICD-10-CM: Z00.00 ICD-9-CM: V70.0 Essential hypertension     ICD-10-CM: I10 
ICD-9-CM: 401.9 Encounter for medication monitoring     ICD-10-CM: Z51.81 
ICD-9-CM: V58.83 Encounter for immunization     ICD-10-CM: K36 ICD-9-CM: V03.89   
 IGT (impaired glucose tolerance)     ICD-10-CM: R73.02 
ICD-9-CM: 790.22 Vitals BP Pulse Temp Resp Height(growth percentile) Weight(growth percentile) 135/81 (BP 1 Location: Left arm, BP Patient Position: Sitting) (!) 58 97.1 °F (36.2 °C) (Oral) 16 5' 6\" (1.676 m) 194 lb (88 kg) LMP SpO2 BMI OB Status Smoking Status 01/01/1994 100% 31.31 kg/m2 Postmenopausal Never Smoker Vitals History BMI and BSA Data Body Mass Index Body Surface Area  
 31.31 kg/m 2 2.02 m 2 Preferred Pharmacy Pharmacy Name Phone [id number]- [place], [place] - [address] [phone] Your Updated Medication List  
  
   
This list is accurate as of: 12/11/17 11:45 AM.  Always use your most recent med list.  
  
  
  
  
 ALPRAZolam 0.25 mg tablet Commonly known as:  XANAX  
TAKE 1 TABLET BY MOUTH AT BEDTIME AS NEEDED FOR SLEEP  
  
 amLODIPine 2.5 mg tablet Commonly known as:  Supa Presser TAKE 1 TABLET BY MOUTH EVERY DAY  
  
 CALCIUM CHEWABLE PLUS PO Take 1 Tab by mouth daily. CLARITIN-D 24 HOUR PO Take 1 Tab by mouth daily as needed. docusate sodium 100 mg capsule Commonly known as:  COLACE  
TAKE 1 CAPSULE BY MOUTH TWICE A DAY AS NEEDED FOR CONSTIPATION  
  
 mometasone 50 mcg/actuation nasal spray Commonly known as:  NASONEX  
2 Sprays by Both Nostrils route daily. TYLENOL EXTRA STRENGTH 500 mg tablet Generic drug:  acetaminophen Take 1,000 mg by mouth every six (6) hours as needed for Pain. VITAMIN D3 1,000 unit Chew Generic drug:  Cholecalciferol (Vitamin D3) Take 1 Tab by mouth daily. We Performed the Following AMB POC URINALYSIS DIP STICK AUTO W/ MICRO [64996 CPT(R)] CBC W/O DIFF [90821 CPT(R)] INFLUENZA VIRUS VAC QUAD,SPLIT,PRESV FREE SYRINGE IM [id number] CPT(R)] METABOLIC PANEL, COMPREHENSIVE [48614 CPT(R)] [place] IMMUNIZ ADMIN,1 SINGLE/COMB VAC/TOXOID [id number] CPT(R)] Follow-up Instructions Return in about 6 months (around 6/11/2018). Introducing [facility] SERVICES! Lien Starks introduces [employer] patient portal. Now you can access parts of your medical record, email your doctor's office, and request medication refills online. 1. In your internet browser, go to [email or web address] I and love and [email or web address] 2. Click on the First Time User? Click Here link in the Sign In box. You will see the New Member Sign Up page. 3. Enter your [employer] Access Code exactly as it appears below. You will not need to use this code after youve completed the sign-up process. If you do not sign up before the expiration date, you must request a new code. · [employer] Access Code: [email or web address] Expires: 3/11/2018 10:01 AM 
 
4. Enter the last four digits of your Social Security Number (xxxx) and Date of Birth (mm/dd/yyyy) as indicated and click Submit. You will be taken to the next sign-up page. 5. Create a [employer] ID. This will be your [employer] login ID and cannot be changed, so think of one that is secure and easy to remember. 6. Create a [employer] password. You can change your password at any time. 7. Enter your Password Reset Question and Answer. This can be used at a later time if you forget your password. 8. Enter your e-mail address. You will receive e-mail notification when new information is available in [address] 9. Click Sign Up. You can now view and download portions of your medical record. 10. Click the Download Summary menu link to download a portable copy of your medical information. If you have questions, please visit the Frequently Asked Questions section of the [employer] website. Remember, [employer] is NOT to be used for urgent needs. For medical emergencies, dial 911. Now available from your iPhone and Android! Please provide this summary of care documentation to your next provider. Your primary care clinician is listed as Gera Castillo. If you have any questions after today's visit, please call [phone].

## 2017-12-12 LAB
25(OH)D3+25(OH)D2 SERPL-MCNC: 34.7 NG/ML (ref 30–100)
ALBUMIN SERPL-MCNC: 4.4 G/DL (ref 3.5–5.5)
ALBUMIN/GLOB SERPL: 1.6 {RATIO} (ref 1.2–2.2)
ALP SERPL-CCNC: 78 IU/L (ref 39–117)
ALT SERPL-CCNC: 12 IU/L (ref 0–32)
AST SERPL-CCNC: 15 IU/L (ref 0–40)
BILIRUB SERPL-MCNC: 0.3 MG/DL (ref 0–1.2)
BUN SERPL-MCNC: 13 MG/DL (ref 6–24)
BUN/CREAT SERPL: 15 (ref 9–23)
CALCIUM SERPL-MCNC: 10.3 MG/DL (ref 8.7–10.2)
CHLORIDE SERPL-SCNC: 102 MMOL/L (ref 96–106)
CO2 SERPL-SCNC: 25 MMOL/L (ref 18–29)
CREAT SERPL-MCNC: 0.85 MG/DL (ref 0.57–1)
ERYTHROCYTE [DISTWIDTH] IN BLOOD BY AUTOMATED COUNT: 14.3 % (ref 12.3–15.4)
GFR SERPLBLD CREATININE-BSD FMLA CKD-EPI: 77 ML/MIN/1.73
GFR SERPLBLD CREATININE-BSD FMLA CKD-EPI: 89 ML/MIN/1.73
GLOBULIN SER CALC-MCNC: 2.7 G/DL (ref 1.5–4.5)
GLUCOSE SERPL-MCNC: 88 MG/DL (ref 65–99)
HCT VFR BLD AUTO: 37.7 % (ref 34–46.6)
HGB BLD-MCNC: 12.4 G/DL (ref 11.1–15.9)
MCH RBC QN AUTO: 29.4 PG (ref 26.6–33)
MCHC RBC AUTO-ENTMCNC: 32.9 G/DL (ref 31.5–35.7)
MCV RBC AUTO: 89 FL (ref 79–97)
PLATELET # BLD AUTO: 295 X10E3/UL (ref 150–379)
POTASSIUM SERPL-SCNC: 4.6 MMOL/L (ref 3.5–5.2)
PROT SERPL-MCNC: 7.1 G/DL (ref 6–8.5)
RBC # BLD AUTO: 4.22 X10E6/UL (ref 3.77–5.28)
SODIUM SERPL-SCNC: 143 MMOL/L (ref 134–144)
WBC # BLD AUTO: 5 X10E3/UL (ref 3.4–10.8)

## 2017-12-15 RX ORDER — PREDNISONE 10 MG/1
TABLET ORAL
Qty: 21 TAB | Refills: 0 | Status: SHIPPED | OUTPATIENT
Start: 2017-12-15 | End: 2018-01-05 | Stop reason: ALTCHOICE

## 2017-12-15 NOTE — TELEPHONE ENCOUNTER
Pt.is requesting a RX refill     acetaminophen (TYLENOL EXTRA STRENGTH) 500 mg tablet     predniSONE (STERAPRED DS) 10 mg dose pack,She can be reached @ [phone]

## 2017-12-15 NOTE — TELEPHONE ENCOUNTER
Spoke with patient and c/o her sciatica acting up again and has had a Prednisone 10mg dose pack in the past and would like it again

## 2017-12-22 RX ORDER — DICLOFENAC SODIUM 75 MG/1
75 TABLET, DELAYED RELEASE ORAL
Qty: 60 TAB | Refills: 3 | Status: SHIPPED | OUTPATIENT
Start: 2017-12-22 | End: 2018-06-11

## 2017-12-22 RX ORDER — DICLOFENAC SODIUM 75 MG/1
75 TABLET, DELAYED RELEASE ORAL
Qty: 60 TAB | Refills: 3 | OUTPATIENT
Start: 2017-12-22

## 2017-12-22 NOTE — TELEPHONE ENCOUNTER
----- Message from Toshia Nunez sent at 12/21/2017 10:01 AM EST -----  Regarding: Dr. Raya Feliz Bernard/Telephone  Pt is calling regarding upset stomach, sciatic  pain in her back and it is moving in her  leg. The best contact is [phone] or [phone].

## 2017-12-22 NOTE — TELEPHONE ENCOUNTER
Spoke with patient and has completed the Prednisone dose pack but still having sciatica. Patient wondering what else she could do. Informed patient will check with Dr. Aneudy Melo and call back. Called patient back and informed that Dr. Aneudy Melo will be sending Diclofenac 75 mg 1 BID as needed to the pharm. Patient verbalized understanding.

## 2018-01-05 ENCOUNTER — OFFICE VISIT (OUTPATIENT)
Dept: FAMILY MEDICINE CLINIC | Age: 58
End: 2018-01-05

## 2018-01-05 VITALS
WEIGHT: 188.2 LBS | DIASTOLIC BLOOD PRESSURE: 85 MMHG | OXYGEN SATURATION: 98 % | HEIGHT: 66 IN | SYSTOLIC BLOOD PRESSURE: 127 MMHG | HEART RATE: 81 BPM | BODY MASS INDEX: 30.25 KG/M2 | TEMPERATURE: 95.9 F | RESPIRATION RATE: 18 BRPM

## 2018-01-05 DIAGNOSIS — M54.32 SCIATICA OF LEFT SIDE: Primary | ICD-10-CM

## 2018-01-05 DIAGNOSIS — R10.30 LOWER ABDOMINAL PAIN: ICD-10-CM

## 2018-01-05 DIAGNOSIS — N30.00 ACUTE CYSTITIS WITHOUT HEMATURIA: ICD-10-CM

## 2018-01-05 LAB
BILIRUB UR QL STRIP: NEGATIVE
GLUCOSE UR-MCNC: NEGATIVE MG/DL
KETONES P FAST UR STRIP-MCNC: NEGATIVE MG/DL
PH UR STRIP: 5.5 [PH] (ref 4.6–8)
PROT UR QL STRIP: NEGATIVE
SP GR UR STRIP: 1 (ref 1–1.03)
UA UROBILINOGEN AMB POC: NORMAL (ref 0.2–1)
URINALYSIS CLARITY POC: CLEAR
URINALYSIS COLOR POC: YELLOW
URINE BLOOD POC: NEGATIVE
URINE LEUKOCYTES POC: NORMAL
URINE NITRITES POC: NEGATIVE

## 2018-01-05 RX ORDER — PREDNISONE 20 MG/1
20 TABLET ORAL
Qty: 5 TAB | Refills: 0 | Status: SHIPPED | OUTPATIENT
Start: 2018-01-05 | End: 2018-01-10

## 2018-01-05 RX ORDER — TRAMADOL HYDROCHLORIDE 50 MG/1
50 TABLET ORAL
Qty: 28 TAB | Refills: 0 | Status: SHIPPED | OUTPATIENT
Start: 2018-01-05 | End: 2018-06-11

## 2018-01-05 RX ORDER — CIPROFLOXACIN 500 MG/1
500 TABLET ORAL 2 TIMES DAILY
Qty: 10 TAB | Refills: 0 | Status: SHIPPED | OUTPATIENT
Start: 2018-01-05 | End: 2018-01-10

## 2018-01-05 RX ORDER — KETOROLAC TROMETHAMINE 30 MG/ML
30 INJECTION, SOLUTION INTRAMUSCULAR; INTRAVENOUS ONCE
Qty: 1 VIAL | Refills: 0
Start: 2018-01-05 | End: 2018-01-05

## 2018-01-05 RX ORDER — KETOROLAC TROMETHAMINE 30 MG/ML
60 INJECTION, SOLUTION INTRAMUSCULAR; INTRAVENOUS ONCE
Qty: 1 VIAL | Refills: 0
Start: 2018-01-05 | End: 2018-01-05

## 2018-01-05 NOTE — PROGRESS NOTES
HISTORY OF PRESENT ILLNESS  Jasmin Villaseñor is a 65 y.o. female. HPI   C/o low back pain for the past few weeks again. Has gotten worse over the last several days. Was seen at [facility] on last Friday and given a steroid dose pack which helped with the pain but pain gotten worse after coming off of the steriods for 1 day. Now also having lower abd pain. Pain feels like a pressure and is relieved with passing her urine. Pain has tingling sensation in the left buttock and upper hip and radiating down her leg. Pain is 9/10 in the past 1-2 days. No injury noted. No previous hx of back problems noted prior to onset of sciatica this past summer. Since being off of the steroid she has only been taking tylenol which has not been helping. Patient Active Problem List   Diagnosis Code    Cancer ([facility] [address] C80.1    Breast cancer genetic susceptibility Z15.01    Hypovitaminosis D E55.9    Osteoporosis M81.0    Hypertension I10    Insomnia G47.00    HX: breast cancer Z85.3    Encounter for medication monitoring Z51.81    IGT (impaired glucose tolerance) R73.02    S/P bilateral mastectomy Z90.13       Current Outpatient Prescriptions   Medication Sig Dispense Refill    diclofenac EC (VOLTAREN) 75 mg EC tablet Take 1 Tab by mouth two (2) times daily as needed. 60 Tab 3    predniSONE (STERAPRED DS) 10 mg dose pack See administration instruction per 10mg dose pack 21 Tab 0    Cholecalciferol, Vitamin D3, (VITAMIN D3) 1,000 unit chew Take 1 Tab by mouth daily.  CALCIUM CARBONATE/VITAMIN D3 (CALCIUM CHEWABLE PLUS PO) Take 1 Tab by mouth daily.       ALPRAZolam (XANAX) 0.25 mg tablet TAKE 1 TABLET BY MOUTH AT BEDTIME AS NEEDED FOR SLEEP 30 Tab 3    amLODIPine (NORVASC) 2.5 mg tablet TAKE 1 TABLET BY MOUTH EVERY DAY 30 Tab 11    docusate sodium (COLACE) 100 mg capsule TAKE 1 CAPSULE BY MOUTH TWICE A DAY AS NEEDED FOR CONSTIPATION  0    acetaminophen (TYLENOL EXTRA STRENGTH) 500 mg tablet Take 1,000 mg by mouth every six (6) hours as needed for Pain.  mometasone (NASONEX) 50 mcg/actuation nasal spray 2 Sprays by Both Nostrils route daily. 1 Container 11    LORATADINE/PSEUDOEPHEDRINE SUL (CLARITIN-D 24 HOUR PO) Take 1 Tab by mouth daily as needed. 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  Cancer [facility]) 2008    left breast    Chemotherapy     2008 and 1994    Hypertension        Past Surgical History:   Procedure Laterality Date    ENDOSCOPY, COLON, DIAGNOSTIC  4/10    manetas. repeat in5 yrs.     HC STEM CELL TRNSPL AUTOLOGOUS  1994    HX BREAST LUMPECTOMY  1994    right    HX BREAST LUMPECTOMY  2008    left breast    HX BREAST RECONSTRUCTION  10/5/2015    HX BREAST RECONSTRUCTION  12/07/2016    completed    HX MASTECTOMY  10/5/2015    Bilateral    HX OOPHORECTOMY  1/28/2013       Family History   Problem Relation Age of Onset    Heart Disease Mother     Hypertension Mother     No Known Problems Father     Diabetes Maternal Grandmother     No Known Problems Maternal Grandfather     No Known Problems Paternal Grandmother     No Known Problems Paternal Grandfather        Social History   Substance Use Topics    Smoking status: Never Smoker    Smokeless tobacco: Never Used    Alcohol use No     Lab Results  Component Value Date/Time   WBC 5.0 12/11/2017 11:49 AM   HGB 12.4 12/11/2017 11:49 AM   HCT 37.7 12/11/2017 11:49 AM   PLATELET 013 46/54/6084 11:49 AM   MCV 89 12/11/2017 11:49 AM     Lab Results  Component Value Date/Time   Cholesterol, total 183 07/24/2017 10:45 AM   HDL Cholesterol 87 07/24/2017 10:45 AM   LDL, calculated 85 07/24/2017 10:45 AM   Triglyceride 56 07/24/2017 10:45 AM     Lab Results   Component Value Date/Time    Sodium 143 12/11/2017 11:49 AM    Potassium 4.6 12/11/2017 11:49 AM    Chloride 102 12/11/2017 11:49 AM    CO2 25 12/11/2017 11:49 AM    Glucose 88 12/11/2017 11:49 AM    BUN 13 12/11/2017 11:49 AM    Creatinine 0.85 12/11/2017 11:49 AM    BUN/Creatinine ratio 15 12/11/2017 11:49 AM    GFR est AA 89 12/11/2017 11:49 AM    GFR est non-AA 77 12/11/2017 11:49 AM    Calcium 10.3 12/11/2017 11:49 AM    Bilirubin, total 0.3 12/11/2017 11:49 AM    ALT (SGPT) 12 12/11/2017 11:49 AM    AST (SGOT) 15 12/11/2017 11:49 AM    Alk. phosphatase 78 12/11/2017 11:49 AM    Protein, total 7.1 12/11/2017 11:49 AM    Albumin 4.4 12/11/2017 11:49 AM    A-G Ratio 1.6 12/11/2017 11:49 AM      Lab Results   Component Value Date/Time    Hemoglobin A1c 5.9 02/25/2014 09:06 AM    Hemoglobin A1c (POC) 5.4 01/23/2017 02:50 PM         ROS    Physical Exam   Constitutional: She appears well-developed and well-nourished. /85 (BP 1 Location: Left arm, BP Patient Position: Standing)  Pulse 81  Temp 95.9 °F (35.5 °C) (Oral)   Resp 18  Ht 5' 6\" (1.676 m)  Wt 188 lb 3.2 oz (85.4 kg)  LMP 01/01/1994  SpO2 98%  BMI 30.38 kg/m2     HENT:   Right Ear: Tympanic membrane and ear canal normal.   Left Ear: Tympanic membrane and ear canal normal.   Nose: No mucosal edema or rhinorrhea. Mouth/Throat: Oropharynx is clear and moist and mucous membranes are normal.   Neck: Normal range of motion. Neck supple. No thyromegaly present. Cardiovascular: Normal rate and regular rhythm. No murmur heard. Pulmonary/Chest: Effort normal and breath sounds normal.   Abdominal: Soft. Bowel sounds are normal. There is no tenderness. Lymphadenopathy:     She has no cervical adenopathy. Psychiatric: She has a normal mood and affect. Nursing note and vitals reviewed. ASSESSMENT and PLAN  Diagnoses and all orders for this visit:    1. Sciatica of left side  -     MRI LUMB SPINE WO CONT; Future  -     traMADol (ULTRAM) 50 mg tablet; Take 1 Tab by mouth every six (6) hours as needed for Pain. Max Daily Amount: 200 mg.  -     predniSONE (DELTASONE) 20 mg tablet; Take 1 Tab by mouth daily (with breakfast) for 5 days. -     ketorolac tromethamine (TORADOL) 60 mg/2 mL soln; 2 mL by IntraMUSCular route once for 1 dose. -     KETOROLAC TROMETHAMINE INJ  -     SC THER/PROPH/DIAG INJECTION, SUBCUT/IM    2. Lower abdominal pain//  3. Acute cystitis without hematuria  -     CULTURE, URINE  -     AMB POC URINALYSIS DIP STICK AUTO W/O MICRO  Urine looks infected. -     ciprofloxacin HCl (CIPRO) 500 mg tablet; Take 1 Tab by mouth two (2) times a day for 5 days. Follow-up Disposition:  Return in about 1 week (around 1/12/2018). reviewed medications and side effects in detail    I have discussed diagnosis listed in this note with pt and/or family. I have discussed treatment plans and options and the risk/benefit analysis of those options, including safe use of medications and possible medication side effects. Through the use of shared decision making we have agreed to the above plan. The patient has received an after-visit summary and questions were answered concerning future plans and follow up. Advise pt of any urgent changes then to proceed to the ER.

## 2018-01-05 NOTE — PROGRESS NOTES
Suhas Rojas is a 65 y.o. female    Chief Complaint   Patient presents with    Back Pain     pt c/o pain level being 9 scale     1. Have you been to the ER, urgent care clinic since your last visit? Hospitalized since your last visit? No    2. Have you seen or consulted any other health care providers outside of the [address] [place] [address] since your last visit? Include any pap smears or colon screening. No     Blood pressure 127/85, pulse 81, temperature 95.9 °F (35.5 °C), temperature source Oral, resp. rate 18, height 5' 6\" (1.676 m), weight 188 lb 3.2 oz (85.4 kg), last menstrual period 01/01/1994, SpO2 98 %. V.O per Dr Karolina Duran. 60 mg/ 2ml of Ketorolac Tromethamine Im for pain, patient observed for 20 minutes post injection , injection tolerated injection well.

## 2018-01-05 NOTE — PROGRESS NOTES
MRI scheduled per Dr. Dena James spoke with Sofi Kelsey) patient informed of date and location (1/9/2018 at 8:30 AM arrive at 8:15 AM at [facility]) and requirements no metal  she acknowledged understanding.

## 2018-01-05 NOTE — MR AVS SNAPSHOT
Visit Information Date & Time Provider Department Dept. Phone Encounter #  
 1/5/2018  9:00 AM Corrinne Mems, Andrew Williamson [phone] [id number] Follow-up Instructions Return in about 1 week (around 1/12/2018). Your Appointments 1/12/2018 10:45 AM  
ROUTINE CARE with Corrinne Mems, MD  
[address]) Appt Note: 1wk f/u [facility] Dr [facility] [phone]  
[phone] [address] [facility] [address] 6/11/2018 10:45 AM  
ROUTINE CARE with Corrinne Mems, MD  
[address]) Appt Note: f/u  
 [address] [facility] [phone]  
[phone] Upcoming Health Maintenance Date Due  
 PAP AKA CERVICAL CYTOLOGY 9/14/2018 Pneumococcal 19-64 Highest Risk (3 of 3 - PCV13) 12/11/2018 COLONOSCOPY 5/20/2020 DTaP/Tdap/Td series (2 - Td) 5/20/2023 Allergies as of 1/5/2018  Review Complete On: 1/5/2018 By: Corrinne Mems, MD  
  
 Severity Noted Reaction Type Reactions Compazine [Prochlorperazine]  01/05/2018    Itching Fentanyl  01/19/2011    Nausea Only Neulasta [Pegfilgrastim]  01/19/2011    Other (comments) Severe pain Percocet [Oxycodone-acetaminophen]  07/24/2017    Other (comments) Hallucinations Reglan [Metoclopramide]  01/19/2011    Other (comments) Muscle tension Sulfa (Sulfonamide Antibiotics)  01/19/2011    Nausea Only Zofran [Ondansetron Hcl (Pf)]  01/19/2011    Itching Current Immunizations  Reviewed on 1/5/2018 Name Date Influenza Vaccine 10/25/2013 Influenza Vaccine (Quad) PF 12/11/2017, 10/17/2016, 12/15/2015 Tdap 5/20/2013  Reviewed by Trudi Shaver LPN on 9/8/1877 at  7:99 AM  
 Reviewed by Corrinne Mems, MD on 1/5/2018 at  9:36 AM  
 Reviewed by Elizabeth Arevalo MD on 1/5/2018 at 10:01 AM  
You Were Diagnosed With   
  
 Codes Comments Sciatica of left side    -  Primary ICD-10-CM: M54.32 
ICD-9-CM: 724.3 Lower abdominal pain     ICD-10-CM: R10.30 ICD-9-CM: 789.09 Acute cystitis without hematuria     ICD-10-CM: N30.00 ICD-9-CM: 595.0 Vitals BP Pulse Temp Resp Height(growth percentile) Weight(growth percentile) 127/85 (BP 1 Location: Left arm, BP Patient Position: Standing) 81 95.9 °F (35.5 °C) (Oral) 18 5' 6\" (1.676 m) 188 lb 3.2 oz (85.4 kg) LMP SpO2 BMI OB Status Smoking Status 01/01/1994 98% 30.38 kg/m2 Postmenopausal Never Smoker BMI and BSA Data Body Mass Index Body Surface Area  
 30.38 kg/m 2 1.99 m 2 Preferred Pharmacy Pharmacy Name Phone [id number], [place] - [address] [phone] Your Updated Medication List  
  
   
This list is accurate as of: 1/5/18 11:24 AM.  Always use your most recent med list.  
  
  
  
  
 ALPRAZolam 0.25 mg tablet Commonly known as:  XANAX  
TAKE 1 TABLET BY MOUTH AT BEDTIME AS NEEDED FOR SLEEP  
  
 amLODIPine 2.5 mg tablet Commonly known as:  Yohana Tracy TAKE 1 TABLET BY MOUTH EVERY DAY  
  
 CALCIUM CHEWABLE PLUS PO Take 1 Tab by mouth daily. ciprofloxacin HCl 500 mg tablet Commonly known as:  CIPRO Take 1 Tab by mouth two (2) times a day for 5 days. CLARITIN-D 24 HOUR PO Take 1 Tab by mouth daily as needed. diclofenac EC 75 mg EC tablet Commonly known as:  VOLTAREN Take 1 Tab by mouth two (2) times daily as needed. docusate sodium 100 mg capsule Commonly known as:  COLACE  
TAKE 1 CAPSULE BY MOUTH TWICE A DAY AS NEEDED FOR CONSTIPATION  
  
 ketorolac tromethamine 60 mg/2 mL Soln Commonly known as:  TORADOL  
2 mL by IntraMUSCular route once for 1 dose. mometasone 50 mcg/actuation nasal spray Commonly known as:  Adilson Watson 2 Sprays by Both Nostrils route daily. predniSONE 20 mg tablet Commonly known as:  [place] Lies Take 1 Tab by mouth daily (with breakfast) for 5 days. traMADol 50 mg tablet Commonly known as:  ULTRAM  
Take 1 Tab by mouth every six (6) hours as needed for Pain. Max Daily Amount: 200 mg.  
  
 TYLENOL EXTRA STRENGTH 500 mg tablet Generic drug:  acetaminophen Take 1,000 mg by mouth every six (6) hours as needed for Pain. VITAMIN D3 1,000 unit Chew Generic drug:  Cholecalciferol (Vitamin D3) Take 1 Tab by mouth daily. Prescriptions Printed Refills  
 traMADol (ULTRAM) 50 mg tablet 0 Sig: Take 1 Tab by mouth every six (6) hours as needed for Pain. Max Daily Amount: 200 mg. Class: Print Route: Oral  
  
Prescriptions Sent to Pharmacy Refills  
 ciprofloxacin HCl (CIPRO) 500 mg tablet 0 Sig: Take 1 Tab by mouth two (2) times a day for 5 days. Class: Normal  
 [facility] [facility] [address] [place] [address] [address] Ph #: [phone] Route: Oral  
 predniSONE (DELTASONE) 20 mg tablet 0 Sig: Take 1 Tab by mouth daily (with breakfast) for 5 days. Class: Normal  
 Pharmacy: [facility] [address] [place] [address] [address] Ph #: [phone] Route: Oral  
  
We Performed the Following AMB POC URINALYSIS DIP STICK AUTO W/O MICRO [07391 CPT(R)] CULTURE, URINE [id number] CPT(R)] KETOROLAC TROMETHAMINE INJ [ [facility]] GA THER/PROPH/DIAG INJECTION, SUBCUT/IM [id number] CPT(R)] Follow-up Instructions Return in about 1 week (around 1/12/2018). To-Do List   
 01/05/2018 Imaging:  MRI LUMB SPINE WO CONT   
  
 01/09/2018 8:30 AM  
  Appointment with [facility] 1 at [address] [place] [address] MRI Department ([phone]) 1.  Please bring a list or a bag of your current medications to your appointment 2. Please be sure to remove ALL hair clips, pins, extensions, etc., prior to arriving for your MRI procedure. 3. If you have any medical implants or devices, please bring associated medical card with you. 4. Bring any non [facility] films or CDs pertaining to the area being imaged with you on the day of appointment. 5. A written order with a valid diagnosis and Physicians  signature is required for all scheduled tests. 6. Check in at registration 30min before your appointment time unless you were instructed to do otherwise. Referral Information Referral ID Referred By Referred To  
  
 [id number] Darryl Ring [facility] Not Available Visits Status Start Date End Date 1 New Request 1/5/18 1/5/19 If your referral has a status of pending review or denied, additional information will be sent to support the outcome of this decision. Introducing [facility]! [facility] introduces [employer] patient portal. Now you can access parts of your medical record, email your doctor's office, and request medication refills online. 1. In your internet browser, go to [email or web address] 2. Click on the First Time User? Click Here link in the Sign In box. You will see the New Member Sign Up page. 3. Enter your Youtopia Access Code exactly as it appears below. You will not need to use this code after youve completed the sign-up process. If you do not sign up before the expiration date, you must request a new code. · Youtopia Access Code: [email or web address] Expires: 3/11/2018 10:01 AM 
 
4. Enter the last four digits of your Social Security Number (xxxx) and Date of Birth (mm/dd/yyyy) as indicated and click Submit. You will be taken to the next sign-up page. 5. Create a hi5t ID. This will be your Youtopia login ID and cannot be changed, so think of one that is secure and easy to remember. 6. Create a hi5t password. You can change your password at any time. 7. Enter your Password Reset Question and Answer. This can be used at a later time if you forget your password. 8. Enter your e-mail address. You will receive e-mail notification when new information is available in [address] 9. Click Sign Up. You can now view and download portions of your medical record. 10. Click the Download Summary menu link to download a portable copy of your medical information. If you have questions, please visit the Frequently Asked Questions section of the [employer] website. Remember, [employer] is NOT to be used for urgent needs. For medical emergencies, dial 911. Now available from your iPhone and Android! Please provide this summary of care documentation to your next provider. Your primary care clinician is listed as Jasmin Guerin. If you have any questions after today's visit, please call [phone].

## 2018-01-07 LAB — BACTERIA UR CULT: NO GROWTH

## 2018-01-09 ENCOUNTER — HOSPITAL ENCOUNTER (OUTPATIENT)
Dept: MRI IMAGING | Age: 58
Discharge: HOME | End: 2018-01-09
Attending: FAMILY MEDICINE
Payer: COMMERCIAL

## 2018-01-09 DIAGNOSIS — M54.32 SCIATICA OF LEFT SIDE: ICD-10-CM

## 2018-01-09 PROCEDURE — 72148 MRI LUMBAR SPINE W/O DYE: CPT

## 2018-01-11 ENCOUNTER — TELEPHONE (OUTPATIENT)
Dept: FAMILY MEDICINE CLINIC | Age: 58
End: 2018-01-11

## 2018-01-11 DIAGNOSIS — M48.061 FORAMINAL STENOSIS OF LUMBAR REGION: Primary | ICD-10-CM

## 2018-01-11 DIAGNOSIS — M54.16 LEFT LUMBAR RADICULOPATHY: ICD-10-CM

## 2018-01-25 DIAGNOSIS — M54.32 SCIATICA OF LEFT SIDE: ICD-10-CM

## 2018-01-25 RX ORDER — TRAMADOL HYDROCHLORIDE 50 MG/1
50 TABLET ORAL
Qty: 28 TAB | Refills: 0 | OUTPATIENT
Start: 2018-01-25

## 2018-01-26 NOTE — TELEPHONE ENCOUNTER
Patient has an appointment with ortho on 2/1/18, explained to patient the regulations in place for ongoing pain management, she agreed she will wait for appointment and take tylenol for pain

## 2018-02-09 RX ORDER — AMLODIPINE BESYLATE 2.5 MG/1
TABLET ORAL
Qty: 30 TAB | Refills: 11 | Status: SHIPPED | OUTPATIENT
Start: 2018-02-09 | End: 2018-06-05 | Stop reason: SDUPTHER

## 2018-06-05 RX ORDER — AMLODIPINE BESYLATE 2.5 MG/1
TABLET ORAL
Qty: 30 TAB | Refills: 11 | Status: SHIPPED | OUTPATIENT
Start: 2018-06-05 | End: 2019-08-14 | Stop reason: SDUPTHER

## 2018-06-05 NOTE — TELEPHONE ENCOUNTER
Last Visit: 01/05/1857-[id number]  Next Appt: 06/11/1888-Iobguo-Ggphno  Previous Refill Encounter: 02/09/18-30+11    Requested Prescriptions     Pending Prescriptions Disp Refills    amLODIPine (NORVASC) 2.5 mg tablet 30 Tab 11     Sig: TAKE 1 TABLET BY MOUTH EVERY DAY

## 2018-06-11 ENCOUNTER — OFFICE VISIT (OUTPATIENT)
Dept: FAMILY MEDICINE CLINIC | Age: 58
End: 2018-06-11

## 2018-06-11 VITALS
HEART RATE: 63 BPM | WEIGHT: 192 LBS | RESPIRATION RATE: 20 BRPM | HEIGHT: 66 IN | BODY MASS INDEX: 30.86 KG/M2 | TEMPERATURE: 97.6 F | SYSTOLIC BLOOD PRESSURE: 117 MMHG | OXYGEN SATURATION: 98 % | DIASTOLIC BLOOD PRESSURE: 78 MMHG

## 2018-06-11 DIAGNOSIS — Z13.1 SCREENING FOR DIABETES MELLITUS: ICD-10-CM

## 2018-06-11 DIAGNOSIS — Z51.81 ENCOUNTER FOR MEDICATION MONITORING: ICD-10-CM

## 2018-06-11 DIAGNOSIS — R73.02 IGT (IMPAIRED GLUCOSE TOLERANCE): ICD-10-CM

## 2018-06-11 DIAGNOSIS — I10 ESSENTIAL HYPERTENSION: Primary | ICD-10-CM

## 2018-06-11 DIAGNOSIS — F51.01 PRIMARY INSOMNIA: ICD-10-CM

## 2018-06-11 LAB — HBA1C MFR BLD HPLC: 5.6 %

## 2018-06-11 RX ORDER — ALPRAZOLAM 0.25 MG/1
TABLET ORAL
Qty: 30 TAB | Refills: 3 | Status: SHIPPED | OUTPATIENT
Start: 2018-06-11 | End: 2019-04-15 | Stop reason: SDUPTHER

## 2018-06-11 NOTE — PROGRESS NOTES
Chief Complaint   Patient presents with    Hypertension     follow up     Mammogram 04/01/2015    Colonoscopy 05/20/2015 repeat in 5 years    Eye exam 05/25/2018    1. Have you been to the ER, urgent care clinic since your last visit? Hospitalized since your last visit? No    2. Have you seen or consulted any other health care providers outside of the [address] [place] [address] since your last visit? Include any pap smears or colon screening.  No     Patient denies pain at this time

## 2018-06-11 NOTE — PROGRESS NOTES
HISTORY OF PRESENT ILLNESS  Annabella Kim is a 63 y.o. female. HPI   Follow up on chronic medical problems. Follow up on BP. Overall feeling well. Hypertension Review:  Compliant w/ low salt diet, and some exercise. Tolerating Norvasc without problems. No home bp monitoring. No swelling, headache or dizziness. No chest pain, SOB, palpitations. Sleeping better with using the xanax prn. Patient Active Problem List   Diagnosis Code    [facility] C80.1    Breast cancer genetic susceptibility Z15.01    Hypovitaminosis D E55.9    Osteoporosis M81.0    Hypertension I10    Insomnia G47.00    HX: breast cancer Z85.3    Encounter for medication monitoring Z51.81    IGT (impaired glucose tolerance) R73.02    S/P bilateral mastectomy Z90.13       Current Outpatient Prescriptions   Medication Sig Dispense Refill    ALPRAZolam (XANAX) 0.25 mg tablet TAKE 1 TABLET BY MOUTH AT BEDTIME AS NEEDED FOR SLEEP 30 Tab 3    amLODIPine (NORVASC) 2.5 mg tablet TAKE 1 TABLET BY MOUTH EVERY DAY 30 Tab 11    Cholecalciferol, Vitamin D3, (VITAMIN D3) 1,000 unit chew Take 1 Tab by mouth daily.  CALCIUM CARBONATE/VITAMIN D3 (CALCIUM CHEWABLE PLUS PO) Take 1 Tab by mouth daily.  docusate sodium (COLACE) 100 mg capsule TAKE 1 CAPSULE BY MOUTH TWICE A DAY AS NEEDED FOR CONSTIPATION  0    acetaminophen (TYLENOL EXTRA STRENGTH) 500 mg tablet Take 1,000 mg by mouth every six (6) hours as needed for Pain.  mometasone (NASONEX) 50 mcg/actuation nasal spray 2 Sprays by Both Nostrils route daily. 1 Container 11    LORATADINE/PSEUDOEPHEDRINE SUL (CLARITIN-D 24 HOUR PO) Take 1 Tab by mouth daily as needed.          Allergies   Allergen Reactions    Compazine [Prochlorperazine] Itching    Fentanyl Nausea Only    Neulasta [Pegfilgrastim] Other (comments)     Severe pain    Percocet [Oxycodone-Acetaminophen] Other (comments)     Hallucinations    Reglan [Metoclopramide] Other (comments)     Muscle tension    Sulfa (Sulfonamide Antibiotics) Nausea Only    Zofran [Ondansetron Hcl (Pf)] Itching       Past Medical History:   Diagnosis Date    Breast cancer genetic susceptibility     [facility] ([facility] 1991    right breast    Cancer ([facility] 1994    reoccurence in the lymph nodes.  Cancer [facility]) 2008    left breast    Chemotherapy     2008 and 1994    Hypertension        Past Surgical History:   Procedure Laterality Date    ENDOSCOPY, COLON, DIAGNOSTIC  4/10    manetas. repeat in5 yrs.     HC STEM CELL TRNSPL AUTOLOGOUS  1994    HX BREAST LUMPECTOMY  1994    right    HX BREAST LUMPECTOMY  2008    left breast    HX BREAST RECONSTRUCTION  10/5/2015    HX BREAST RECONSTRUCTION  12/07/2016    completed    HX MASTECTOMY  10/5/2015    Bilateral    HX OOPHORECTOMY  1/28/2013       Family History   Problem Relation Age of Onset    Heart Disease Mother     Hypertension Mother     No Known Problems Father     Diabetes Maternal Grandmother     No Known Problems Maternal Grandfather     No Known Problems Paternal Grandmother     No Known Problems Paternal Grandfather        Social History   Substance Use Topics    Smoking status: Never Smoker    Smokeless tobacco: Never Used    Alcohol use No        Lab Results  Component Value Date/Time   WBC 5.0 12/11/2017 11:49 AM   HGB 12.4 12/11/2017 11:49 AM   HCT 37.7 12/11/2017 11:49 AM   PLATELET 949 97/34/0814 11:49 AM   MCV 89 12/11/2017 11:49 AM     Lab Results  Component Value Date/Time   Cholesterol, total 183 07/24/2017 10:45 AM   HDL Cholesterol 87 07/24/2017 10:45 AM   LDL, calculated 85 07/24/2017 10:45 AM   Triglyceride 56 07/24/2017 10:45 AM     No results found for: TSH, TSH2, TSH3, TSHP, TSHELE, TSHEXT, TT3, T3U, T3UP, FRT3, FT3, FT4, FT4P, T4, T4P, FT4T, TT7, TSHEXT   Lab Results   Component Value Date/Time    Sodium 143 12/11/2017 11:49 AM    Potassium 4.6 12/11/2017 11:49 AM    Chloride 102 12/11/2017 11:49 AM    CO2 25 12/11/2017 11:49 AM    Glucose 88 12/11/2017 11:49 AM    BUN 13 12/11/2017 11:49 AM    Creatinine 0.85 12/11/2017 11:49 AM    BUN/Creatinine ratio 15 12/11/2017 11:49 AM    GFR est AA 89 12/11/2017 11:49 AM    GFR est non-AA 77 12/11/2017 11:49 AM    Calcium 10.3 (H) 12/11/2017 11:49 AM    Bilirubin, total 0.3 12/11/2017 11:49 AM    ALT (SGPT) 12 12/11/2017 11:49 AM    AST (SGOT) 15 12/11/2017 11:49 AM    Alk. phosphatase 78 12/11/2017 11:49 AM    Protein, total 7.1 12/11/2017 11:49 AM    Albumin 4.4 12/11/2017 11:49 AM    A-G Ratio 1.6 12/11/2017 11:49 AM      Lab Results   Component Value Date/Time    Hemoglobin A1c 5.9 (H) 02/25/2014 09:06 AM    Hemoglobin A1c (POC) 5.6 06/11/2018 11:29 AM         Review of Systems   Constitutional: Negative for malaise/fatigue. HENT: Negative for congestion. Eyes: Negative for blurred vision. Respiratory: Negative for cough and shortness of breath. Cardiovascular: Negative for chest pain, palpitations and leg swelling. Gastrointestinal: Negative for abdominal pain, constipation and heartburn. Genitourinary: Negative for dysuria, frequency and urgency. Musculoskeletal: Negative for back pain and joint pain. Neurological: Negative for dizziness, tingling and headaches. Endo/Heme/Allergies: Negative for environmental allergies. Psychiatric/Behavioral: Negative for depression. The patient does not have insomnia. Physical Exam   Constitutional: She appears well-developed and well-nourished. /78 (BP 1 Location: Left arm, BP Patient Position: Sitting)  Pulse 63  Temp 97.6 °F (36.4 °C) (Oral)   Resp 20  Ht 5' 6\" (1.676 m)  Wt 192 lb (87.1 kg)  LMP 01/01/1994  SpO2 98%  BMI 30.99 kg/m2     HENT:   Right Ear: Tympanic membrane and ear canal normal.   Left Ear: Tympanic membrane and ear canal normal.   Nose: No mucosal edema or rhinorrhea. Mouth/Throat: Oropharynx is clear and moist and mucous membranes are normal.   Neck: Normal range of motion. Neck supple.  No thyromegaly present. Cardiovascular: Normal rate and regular rhythm. No murmur heard. Pulmonary/Chest: Effort normal and breath sounds normal.   Abdominal: Soft. Bowel sounds are normal. There is no tenderness. Musculoskeletal: Normal range of motion. She exhibits no edema. Lymphadenopathy:     She has no cervical adenopathy. Skin: Skin is warm and dry. Psychiatric: She has a normal mood and affect. Nursing note and vitals reviewed. ASSESSMENT and PLAN  Diagnoses and all orders for this visit:    1. Essential hypertension  Stable at goal  -     LIPID PANEL    2. Primary insomnia  -     Refill ALPRAZolam (XANAX) 0.25 mg tablet; TAKE 1 TABLET BY MOUTH AT BEDTIME AS NEEDED FOR SLEEP    3. Screening for diabetes mellitus  -     AMB POC HEMOGLOBIN A1C    4. Encounter for medication monitoring  -     METABOLIC PANEL, BASIC      Follow-up Disposition:  Return in about 6 months (around 12/11/2018) for physical.  reviewed diet, exercise and weight control  cardiovascular risk and specific lipid/LDL goals reviewed  reviewed medications and side effects in detail    I have discussed diagnosis listed in this note with pt and/or family. I have discussed treatment plans and options and the risk/benefit analysis of those options, including safe use of medications and possible medication side effects. Through the use of shared decision making we have agreed to the above plan. The patient has received an after-visit summary and questions were answered concerning future plans and follow up. Advise pt of any urgent changes then to proceed to the ER.

## 2018-06-11 NOTE — MR AVS SNAPSHOT
[address] [facility] 
 
 
 [address] [facility] [id number] [phone] Patient: Johan Jung MRN: [id number] EIA:[id number] Visit Information Date & Time Provider Department Dept. Phone Encounter #  
 6/11/2018 10:45 AM Nancy Egan MD [address] [phone] [id number] Follow-up Instructions Return in about 6 months (around 12/11/2018) for physical.  
  
Upcoming Health Maintenance Date Due Influenza Age 5 to Adult 8/1/2018 Pneumococcal 19-64 Highest Risk (3 of 3 - PCV13) 12/11/2018 COLONOSCOPY 5/20/2020 PAP AKA CERVICAL CYTOLOGY 3/30/2021 DTaP/Tdap/Td series (2 - Td) 5/20/2023 Allergies as of 6/11/2018  Review Complete On: 6/11/2018 By: Nancy Egan MD  
  
 Severity Noted Reaction Type Reactions Compazine [Prochlorperazine]  01/05/2018    Itching Fentanyl  01/19/2011    Nausea Only Neulasta [Pegfilgrastim]  01/19/2011    Other (comments) Severe pain Percocet [Oxycodone-acetaminophen]  07/24/2017    Other (comments) Hallucinations Reglan [Metoclopramide]  01/19/2011    Other (comments) Muscle tension Sulfa (Sulfonamide Antibiotics)  01/19/2011    Nausea Only Zofran [Ondansetron Hcl (Pf)]  01/19/2011    Itching Current Immunizations  Reviewed on 6/11/2018 Name Date Influenza Vaccine 10/25/2013 Influenza Vaccine (Quad) PF 12/11/2017, 10/17/2016, 12/15/2015 Tdap 5/20/2013 Reviewed by Nancy Egan MD on 6/11/2018 at 11:15 AM  
You Were Diagnosed With   
  
 Codes Comments Essential hypertension    -  Primary ICD-10-CM: I10 
ICD-9-CM: 401.9 Primary insomnia     ICD-10-CM: F51.01 
ICD-9-CM: 307.42 Encounter for medication monitoring     ICD-10-CM: Z51.81 
ICD-9-CM: V58.83 IGT (impaired glucose tolerance)     ICD-10-CM: R73.02 
ICD-9-CM: 790.22 Screening for diabetes mellitus     ICD-10-CM: Z13.1 ICD-9-CM: V77.1 Vitals BP Pulse Temp Resp Height(growth percentile) Weight(growth percentile) 117/78 (BP 1 Location: Left arm, BP Patient Position: Sitting) 63 97.6 °F (36.4 °C) (Oral) 20 5' 6\" (1.676 m) 192 lb (87.1 kg) LMP SpO2 BMI OB Status Smoking Status 01/01/1994 98% 30.99 kg/m2 Postmenopausal Never Smoker BMI and BSA Data Body Mass Index Body Surface Area 30.99 kg/m 2 2.01 m 2 Preferred Pharmacy Pharmacy Name Phone [id number]- [place], [place] - [address] [phone] Your Updated Medication List  
  
   
This list is accurate as of 6/11/18 11:21 AM.  Always use your most recent med list.  
  
  
  
  
 ALPRAZolam 0.25 mg tablet Commonly known as:  XANAX  
TAKE 1 TABLET BY MOUTH AT BEDTIME AS NEEDED FOR SLEEP  
  
 amLODIPine 2.5 mg tablet Commonly known as:  Nanette Ape TAKE 1 TABLET BY MOUTH EVERY DAY  
  
 CALCIUM CHEWABLE PLUS PO Take 1 Tab by mouth daily. CLARITIN-D 24 HOUR PO Take 1 Tab by mouth daily as needed. docusate sodium 100 mg capsule Commonly known as:  COLACE  
TAKE 1 CAPSULE BY MOUTH TWICE A DAY AS NEEDED FOR CONSTIPATION  
  
 mometasone 50 mcg/actuation nasal spray Commonly known as:  NASONEX  
2 Sprays by Both Nostrils route daily. TYLENOL EXTRA STRENGTH 500 mg tablet Generic drug:  acetaminophen Take 1,000 mg by mouth every six (6) hours as needed for Pain. VITAMIN D3 1,000 unit Chew Generic drug:  Cholecalciferol (Vitamin D3) Take 1 Tab by mouth daily. Prescriptions Printed Refills ALPRAZolam (XANAX) 0.25 mg tablet 3 Sig: TAKE 1 TABLET BY MOUTH AT BEDTIME AS NEEDED FOR SLEEP Class: Print We Performed the Following AMB POC HEMOGLOBIN A1C [99038 CPT(R)] LIPID PANEL [16140 CPT(R)] METABOLIC PANEL, BASIC [81582 CPT(R)] Follow-up Instructions  Return in about 6 months (around 12/11/2018) for physical.  
  
  
 Introducing [facility]! Dear Carmelita Solitario: Thank you for requesting a [employer] account. Our records indicate that you already have an active [employer] account. You can access your account anytime at [email or web address] Did you know that you can access your hospital and ER discharge instructions at any time in [employer]? You can also review all of your test results from your hospital stay or ER visit. Additional Information If you have questions, please visit the Frequently Asked Questions section of the [employer] website at [email or web address] Remember, [employer] is NOT to be used for urgent needs. For medical emergencies, dial 911. Now available from your iPhone and Android! Please provide this summary of care documentation to your next provider. Your primary care clinician is listed as Nancy Egan. If you have any questions after today's visit, please call [phone].

## 2018-06-12 LAB
BUN SERPL-MCNC: 13 MG/DL (ref 6–24)
BUN/CREAT SERPL: 16 (ref 9–23)
CALCIUM SERPL-MCNC: 9.5 MG/DL (ref 8.7–10.2)
CHLORIDE SERPL-SCNC: 102 MMOL/L (ref 96–106)
CHOLEST SERPL-MCNC: 201 MG/DL (ref 100–199)
CO2 SERPL-SCNC: 26 MMOL/L (ref 20–29)
CREAT SERPL-MCNC: 0.81 MG/DL (ref 0.57–1)
GFR SERPLBLD CREATININE-BSD FMLA CKD-EPI: 81 ML/MIN/1.73
GFR SERPLBLD CREATININE-BSD FMLA CKD-EPI: 93 ML/MIN/1.73
GLUCOSE SERPL-MCNC: 84 MG/DL (ref 65–99)
HDLC SERPL-MCNC: 85 MG/DL
INTERPRETATION, 910389: NORMAL
LDLC SERPL CALC-MCNC: 104 MG/DL (ref 0–99)
POTASSIUM SERPL-SCNC: 4.5 MMOL/L (ref 3.5–5.2)
SODIUM SERPL-SCNC: 141 MMOL/L (ref 134–144)
TRIGL SERPL-MCNC: 58 MG/DL (ref 0–149)
VLDLC SERPL CALC-MCNC: 12 MG/DL (ref 5–40)

## 2018-12-14 ENCOUNTER — OFFICE VISIT (OUTPATIENT)
Dept: FAMILY MEDICINE CLINIC | Age: 58
End: 2018-12-14

## 2018-12-14 VITALS
OXYGEN SATURATION: 99 % | HEIGHT: 66 IN | RESPIRATION RATE: 16 BRPM | SYSTOLIC BLOOD PRESSURE: 138 MMHG | TEMPERATURE: 96.9 F | BODY MASS INDEX: 31.88 KG/M2 | WEIGHT: 198.4 LBS | HEART RATE: 72 BPM | DIASTOLIC BLOOD PRESSURE: 82 MMHG

## 2018-12-14 DIAGNOSIS — I10 ESSENTIAL HYPERTENSION: Primary | ICD-10-CM

## 2018-12-14 DIAGNOSIS — Z91.09 ENVIRONMENTAL ALLERGIES: ICD-10-CM

## 2018-12-14 DIAGNOSIS — R73.02 IGT (IMPAIRED GLUCOSE TOLERANCE): ICD-10-CM

## 2018-12-14 DIAGNOSIS — Z51.81 ENCOUNTER FOR MEDICATION MONITORING: ICD-10-CM

## 2018-12-14 DIAGNOSIS — E66.9 NON MORBID OBESITY: ICD-10-CM

## 2018-12-14 LAB — HBA1C MFR BLD HPLC: 5.6 %

## 2018-12-14 RX ORDER — DICLOFENAC SODIUM 75 MG/1
TABLET, DELAYED RELEASE ORAL
COMMUNITY
Start: 2017-12-22 | End: 2018-12-14 | Stop reason: ALTCHOICE

## 2018-12-14 RX ORDER — TRAMADOL HYDROCHLORIDE 50 MG/1
50 TABLET ORAL
COMMUNITY
Start: 2018-01-05 | End: 2018-12-14 | Stop reason: ALTCHOICE

## 2018-12-14 NOTE — PROGRESS NOTES
HISTORY OF PRESENT ILLNESS  Anthony Malone is a 63 y.o. female. HPI   Follow up on chronic medical problems. Woke up this morning feeling a little anxious. One of her family members is ill and this has been on her mind. Did not sleep well on last night. Hypertension Review:  Compliant w/ low salt diet, and some exercise. Tolerating Norvasc without problems. No home bp monitoring. No swelling, headache or dizziness. No chest pain, SOB, palpitations. Sleeping better with using the xanax. Glucose intolerance reveiw:  She has IGT. Diabetic ROS - further diabetic ROS: no polyuria or polydipsia, no chest pain, dyspnea or TIA's, no numbness, tingling or pain in extremities, no unusual visual symptoms. Lab review: orders written for new lab studies as appropriate; see orders. Recent b/l mastectomy for hx of breast cancer October 2015. Overall recovering well. Just finished her breast reconstruction on last month. Patient Active Problem List   Diagnosis Code    [facility]    Breast cancer genetic susceptibility Z15.01    Hypovitaminosis D E55.9    Osteoporosis M81.0    Hypertension I10    Insomnia G47.00    HX: breast cancer Z85.3    Encounter for medication monitoring Z51.81    IGT (impaired glucose tolerance) R73.02    S/P bilateral mastectomy Z90.13       Current Outpatient Medications   Medication Sig Dispense Refill    ALPRAZolam (XANAX) 0.25 mg tablet TAKE 1 TABLET BY MOUTH AT BEDTIME AS NEEDED FOR SLEEP 30 Tab 3    amLODIPine (NORVASC) 2.5 mg tablet TAKE 1 TABLET BY MOUTH EVERY DAY 30 Tab 11    Cholecalciferol, Vitamin D3, (VITAMIN D3) 1,000 unit chew Take 1 Tab by mouth daily.  CALCIUM CARBONATE/VITAMIN D3 (CALCIUM CHEWABLE PLUS PO) Take 1 Tab by mouth daily.       docusate sodium (COLACE) 100 mg capsule TAKE 1 CAPSULE BY MOUTH TWICE A DAY AS NEEDED FOR CONSTIPATION  0    acetaminophen (TYLENOL EXTRA STRENGTH) 500 mg tablet Take 1,000 mg by mouth every six (6) hours as needed for Pain.  mometasone (NASONEX) 50 mcg/actuation nasal spray 2 Sprays by Both Nostrils route daily. 1 Container 11    LORATADINE/PSEUDOEPHEDRINE SUL (CLARITIN-D 24 HOUR PO) Take 1 Tab by mouth daily as needed. Allergies   Allergen Reactions    Compazine [Prochlorperazine] Itching    Fentanyl Nausea Only    Neulasta [Pegfilgrastim] Other (comments)     Severe pain    Percocet [Oxycodone-Acetaminophen] Other (comments)     Hallucinations    Reglan [Metoclopramide] Other (comments)     Muscle tension    Sulfa (Sulfonamide Antibiotics) Nausea Only    Zofran [Ondansetron Hcl (Pf)] Itching         Past Medical History:   Diagnosis Date    Breast cancer genetic susceptibility     Cancer ([facility] 1991    right breast    Cancer ([facility] 1994    reoccurence in the lymph nodes.  Cancer [facility]) 2008    left breast    Chemotherapy     2008 and 1994    Hypertension          Past Surgical History:   Procedure Laterality Date    ENDOSCOPY, COLON, DIAGNOSTIC  4/10    manetas. repeat in5 yrs.     HC STEM CELL TRNSPL AUTOLOGOUS  1994    HX BREAST LUMPECTOMY  1994    right    HX BREAST LUMPECTOMY  2008    left breast    HX BREAST RECONSTRUCTION  10/5/2015    HX BREAST RECONSTRUCTION  12/07/2016    completed    HX MASTECTOMY  10/5/2015    Bilateral    HX OOPHORECTOMY  1/28/2013         Family History   Problem Relation Age of Onset    Heart Disease Mother     Hypertension Mother     No Known Problems Father     Diabetes Maternal Grandmother     No Known Problems Maternal Grandfather     No Known Problems Paternal Grandmother     No Known Problems Paternal Grandfather        Social History     Tobacco Use    Smoking status: Never Smoker    Smokeless tobacco: Never Used   Substance Use Topics    Alcohol use: No     Alcohol/week: 0.0 oz        Lab Results   Component Value Date/Time    WBC 5.0 12/11/2017 11:49 AM    HGB 12.4 12/11/2017 11:49 AM    HCT 37.7 12/11/2017 11:49 AM PLATELET 329 16/34/0844 11:49 AM    MCV 89 12/11/2017 11:49 AM       Lab Results   Component Value Date/Time    Cholesterol, total 201 (H) 06/11/2018 11:29 AM    HDL Cholesterol 85 06/11/2018 11:29 AM    LDL, calculated 104 (H) 06/11/2018 11:29 AM    Triglyceride 58 06/11/2018 11:29 AM       Lab Results   Component Value Date/Time    Sodium 141 06/11/2018 11:29 AM    Potassium 4.5 06/11/2018 11:29 AM    Chloride 102 06/11/2018 11:29 AM    CO2 26 06/11/2018 11:29 AM    Glucose 84 06/11/2018 11:29 AM    BUN 13 06/11/2018 11:29 AM    Creatinine 0.81 06/11/2018 11:29 AM    BUN/Creatinine ratio 16 06/11/2018 11:29 AM    GFR est AA 93 06/11/2018 11:29 AM    GFR est non-AA 81 06/11/2018 11:29 AM    Calcium 9.5 06/11/2018 11:29 AM    Bilirubin, total 0.3 12/11/2017 11:49 AM    ALT (SGPT) 12 12/11/2017 11:49 AM    AST (SGOT) 15 12/11/2017 11:49 AM    Alk. phosphatase 78 12/11/2017 11:49 AM    Protein, total 7.1 12/11/2017 11:49 AM    Albumin 4.4 12/11/2017 11:49 AM    A-G Ratio 1.6 12/11/2017 11:49 AM      Lab Results   Component Value Date/Time    Hemoglobin A1c 5.9 (H) 02/25/2014 09:06 AM    Hemoglobin A1c (POC) 5.6 06/11/2018 11:29 AM         Review of Systems   Constitutional: Negative for malaise/fatigue. HENT: Positive for congestion and sinus pain. Increased congestion she thinks d/t her allergies. Eyes: Negative for blurred vision. Respiratory: Negative for cough and shortness of breath. Cardiovascular: Negative for chest pain, palpitations and leg swelling. Gastrointestinal: Negative for abdominal pain, constipation and heartburn. Genitourinary: Negative for dysuria, frequency and urgency. Musculoskeletal: Negative for back pain and joint pain. Neurological: Negative for dizziness, tingling and headaches. Endo/Heme/Allergies: Positive for environmental allergies. Psychiatric/Behavioral: Negative for depression. The patient does not have insomnia.         Physical Exam Constitutional: She appears well-developed and well-nourished. /82   Pulse 72   Temp 96.9 °F (36.1 °C) (Oral)   Resp 16   Ht 5' 6\" (1.676 m)   Wt 198 lb 6.4 oz (90 kg)   LMP 01/01/1994   SpO2 99%   BMI 32.02 kg/m²    HENT:   Right Ear: Tympanic membrane and ear canal normal.   Left Ear: Tympanic membrane and ear canal normal.   Nose: Mucosal edema and rhinorrhea present. Mouth/Throat: Mucous membranes are normal. Posterior oropharyngeal erythema present. No oropharyngeal exudate. Neck: Trachea normal, normal range of motion and full passive range of motion without pain. Neck supple. No edema present. No thyromegaly present. Cardiovascular: Normal rate, regular rhythm and normal heart sounds. Exam reveals no gallop. Pulmonary/Chest: Effort normal and breath sounds normal. She has no wheezes. She has no rales. Abdominal: Soft. Normal appearance and bowel sounds are normal. She exhibits no mass. There is no tenderness. Musculoskeletal: Normal range of motion. She exhibits no edema. Lymphadenopathy:     She has no cervical adenopathy. Skin: Skin is warm and dry. Psychiatric: She has a normal mood and affect. Nursing note and vitals reviewed. ASSESSMENT and PLAN  Diagnoses and all orders for this visit:    1. Essential hypertension  Discussed sodium restriction, high k rich diet, maintaining ideal body weight and regular exercise program such as daily walking 30 min perday 4-5 times per week, as physiologic means to achieve blood pressure control.  Medication compliance advised. 2. IGT (impaired glucose tolerance)  -     AMB POC HEMOGLOBIN A1C    3. Environmental allergies  Add claritin and continue with her Flonase daily. 4. Encounter for medication monitoring  -     METABOLIC PANEL, COMPREHENSIVE  -     CBC W/O DIFF    5. Non morbid obesity  I have reviewed/discussed the above normal BMI with the patient.   I have recommended the following interventions: dietary management education, guidance, and counseling . Follow-up Disposition:  Return in about 4 months (around 4/14/2019) for physical.  reviewed diet, exercise and weight control  cardiovascular risk and specific lipid/LDL goals reviewed  reviewed medications and side effects in detail  specific diabetic recommendations: low cholesterol diet, weight control and daily exercise discussed and glycohemoglobin and other lab monitoring discussed     I have discussed diagnosis listed in this note with pt and/or family. I have discussed treatment plans and options and the risk/benefit analysis of those options, including safe use of medications and possible medication side effects. Through the use of shared decision making we have agreed to the above plan. The patient has received an after-visit summary and questions were answered concerning future plans and follow up. Advise pt of any urgent changes then to proceed to the ER.

## 2018-12-14 NOTE — PROGRESS NOTES
Chief Complaint   Patient presents with    Hypertension     follow up    Anxiety     pt c/o feeling anxious and woke up this mornng feeling that way    Nasal Congestion     x 2 days    Pressure Behind the Eyes     patient c/o sinus pressure behind x 2 days       Mammogram 4/1/2015. Patient has had bilateral mastectomy. Colonoscopy 05/20/2015 by Dr. Mckeon Postin repeat in 5 years    Eye exam 05/25/2018    1. Have you been to the ER, urgent care clinic since your last visit? Hospitalized since your last visit? No    2. Have you seen or consulted any other health care providers outside of the [address] [place] [address] since your last visit? Include any pap smears or colon screening.   No

## 2018-12-14 NOTE — PROGRESS NOTES
Subjective:   63 y.o. female for Well Woman Check. Her gyne and breast care is done elsewhere by her Ob-Gyne physician. Hypertension Review:  Compliant w/ low salt diet, and some exercise. Tolerating Norvasc without problems. No home bp monitoring. No swelling, headache or dizziness. No chest pain, SOB, palpitations. Sleeping better with using the xanax. Patient Active Problem List   Diagnosis Code    [facility]    Breast cancer genetic susceptibility Z15.01    Hypovitaminosis D E55.9    Osteoporosis M81.0    Hypertension I10    Insomnia G47.00    HX: breast cancer Z85.3    Encounter for medication monitoring Z51.81    IGT (impaired glucose tolerance) R73.02    S/P bilateral mastectomy Z90.13       Current Outpatient Medications   Medication Sig Dispense Refill    ALPRAZolam (XANAX) 0.25 mg tablet TAKE 1 TABLET BY MOUTH AT BEDTIME AS NEEDED FOR SLEEP 30 Tab 3    amLODIPine (NORVASC) 2.5 mg tablet TAKE 1 TABLET BY MOUTH EVERY DAY 30 Tab 11    Cholecalciferol, Vitamin D3, (VITAMIN D3) 1,000 unit chew Take 1 Tab by mouth daily.  CALCIUM CARBONATE/VITAMIN D3 (CALCIUM CHEWABLE PLUS PO) Take 1 Tab by mouth daily.  docusate sodium (COLACE) 100 mg capsule TAKE 1 CAPSULE BY MOUTH TWICE A DAY AS NEEDED FOR CONSTIPATION  0    acetaminophen (TYLENOL EXTRA STRENGTH) 500 mg tablet Take 1,000 mg by mouth every six (6) hours as needed for Pain.  mometasone (NASONEX) 50 mcg/actuation nasal spray 2 Sprays by Both Nostrils route daily. 1 Container 11    LORATADINE/PSEUDOEPHEDRINE SUL (CLARITIN-D 24 HOUR PO) Take 1 Tab by mouth daily as needed.          Allergies   Allergen Reactions    Compazine [Prochlorperazine] Itching    Fentanyl Nausea Only    Neulasta [Pegfilgrastim] Other (comments)     Severe pain    Percocet [Oxycodone-Acetaminophen] Other (comments)     Hallucinations    Reglan [Metoclopramide] Other (comments)     Muscle tension    Sulfa (Sulfonamide Antibiotics) Nausea Only    Zofran [Ondansetron Hcl (Pf)] Itching         Past Medical History:   Diagnosis Date    Breast cancer genetic susceptibility     Cancer ([facility] 1991    right breast    Cancer ([facility] 1994    reoccurence in the lymph nodes.  Cancer [facility]) 2008    left breast    Chemotherapy     2008 and 1994    Hypertension          Past Surgical History:   Procedure Laterality Date    ENDOSCOPY, COLON, DIAGNOSTIC  4/10    manetas. repeat in5 yrs.     HC STEM CELL TRNSPL AUTOLOGOUS  1994    HX BREAST LUMPECTOMY  1994    right    HX BREAST LUMPECTOMY  2008    left breast    HX BREAST RECONSTRUCTION  10/5/2015    HX BREAST RECONSTRUCTION  12/07/2016    completed    HX MASTECTOMY  10/5/2015    Bilateral    HX OOPHORECTOMY  1/28/2013         Family History   Problem Relation Age of Onset    Heart Disease Mother     Hypertension Mother     No Known Problems Father     Diabetes Maternal Grandmother     No Known Problems Maternal Grandfather     No Known Problems Paternal Grandmother     No Known Problems Paternal Grandfather        Social History     Tobacco Use    Smoking status: Never Smoker    Smokeless tobacco: Never Used   Substance Use Topics    Alcohol use: No     Alcohol/week: 0.0 oz     Lab Results   Component Value Date/Time    WBC 5.0 12/11/2017 11:49 AM    HGB 12.4 12/11/2017 11:49 AM    HCT 37.7 12/11/2017 11:49 AM    PLATELET 224 24/62/4017 11:49 AM    MCV 89 12/11/2017 11:49 AM     Lab Results   Component Value Date/Time    Cholesterol, total 201 (H) 06/11/2018 11:29 AM    HDL Cholesterol 85 06/11/2018 11:29 AM    LDL, calculated 104 (H) 06/11/2018 11:29 AM    Triglyceride 58 06/11/2018 11:29 AM     Lab Results   Component Value Date/Time    Sodium 141 06/11/2018 11:29 AM    Potassium 4.5 06/11/2018 11:29 AM    Chloride 102 06/11/2018 11:29 AM    CO2 26 06/11/2018 11:29 AM    Glucose 84 06/11/2018 11:29 AM    BUN 13 06/11/2018 11:29 AM    Creatinine 0.81 06/11/2018 11:29 AM BUN/Creatinine ratio 16 06/11/2018 11:29 AM    GFR est AA 93 06/11/2018 11:29 AM    GFR est non-AA 81 06/11/2018 11:29 AM    Calcium 9.5 06/11/2018 11:29 AM    Bilirubin, total 0.3 12/11/2017 11:49 AM    ALT (SGPT) 12 12/11/2017 11:49 AM    AST (SGOT) 15 12/11/2017 11:49 AM    Alk. phosphatase 78 12/11/2017 11:49 AM    Protein, total 7.1 12/11/2017 11:49 AM    Albumin 4.4 12/11/2017 11:49 AM    A-G Ratio 1.6 12/11/2017 11:49 AM      Lab Results   Component Value Date/Time    Hemoglobin A1c 5.9 (H) 02/25/2014 09:06 AM    Hemoglobin A1c (POC) 5.6 12/14/2018 08:54 AM      ROS: Feeling generally well. No TIA's or unusual headaches, no dysphagia. No prolonged cough. No dyspnea or chest pain on exertion. No abdominal pain, change in bowel habits, black or bloody stools. No urinary tract symptoms. No new or unusual musculoskeletal symptoms. Objective: The patient appears well, alert, oriented x 3, in no distress. Visit Vitals  /82   Pulse 72   Temp 96.9 °F (36.1 °C) (Oral)   Resp 16   Ht 5' 6\" (1.676 m)   Wt 198 lb 6.4 oz (90 kg)   LMP 01/01/1994   SpO2 99%   BMI 32.02 kg/m²     ENT normal.  Neck supple. No adenopathy or thyromegaly. JUDITH. Lungs are clear, good air entry, no wheezes, rhonchi or rales. S1 and S2 normal, no murmurs, regular rate and rhythm. Abdomen soft without tenderness, guarding, mass or organomegaly. Extremities show no edema, normal peripheral pulses. Neurological is normal, no focal findings. No axillary adenopathy noted. Breast and Pelvic exams are deferred. Assessment/Plan:   Well Woman  lose weight, increase physical activity, follow low fat diet, follow low salt diet, routine labs ordered, call if any problems  Diagnoses and all orders for this visit:    1. Routine general medical examination at a health care facility  -     AMB POC URINALYSIS DIP STICK AUTO W/ MICRO    2.  Essential hypertension  Discussed sodium restriction, high k rich diet, maintaining ideal body weight and regular exercise program such as daily walking 30 min perday 4-5 times per week, as physiologic means to achieve blood pressure control.  Medication compliance advised. 3. IGT (impaired glucose tolerance)  Continue to monitor. Work on diet and exercise. 4. Encounter for medication monitoring  -     METABOLIC PANEL, COMPREHENSIVE  -     CBC W/O DIFF    5. Encounter for immunization  -     UT IMMUNIZ ADMIN,1 SINGLE/COMB VAC/TOXOID  -     Influenza virus vaccine (QUADRIVALENT PRES FREE SYRINGE) IM (42124)    6. S/P bilateral mastectomy  No mammogram needed. 7. Non morbid obesity  Discussed weight loss options, diet and exercise. Also reviewed health issues related to obesity. Initial goal of weight loss 10% of current weight. Counseled on goal for exercise of eventual goal of 30-60 minutes 5-7 times a week as per AHA guidelines. Decrease carbohydrates (white foods, sweet foods, sweet drinks and alcohol), increase green leafy vegetables and protein (lean meats and beans). Avoid fried foods. Increase water intake. Eat 3-5 small meals daily. Increase physical activity. 8. Hypovitaminosis D  -     VITAMIN D, 25 HYDROXY      Follow-up Disposition:  Return in about 4 months (around 4/14/2019) for physical.  reviewed diet, exercise and weight control  cardiovascular risk and specific lipid/LDL goals reviewed  reviewed medications and side effects in detail  specific diabetic recommendations: low cholesterol diet, weight control and daily exercise discussed and glycohemoglobin and other lab monitoring discussed     I have discussed diagnosis listed in this note with pt and/or family. I have discussed treatment plans and options and the risk/benefit analysis of those options, including safe use of medications and possible medication side effects. Through the use of shared decision making we have agreed to the above plan.  The patient has received an after-visit summary and questions were answered concerning future plans and follow up. Advise pt of any urgent changes then to proceed to the ER.

## 2018-12-15 LAB
ALBUMIN SERPL-MCNC: 4.2 G/DL (ref 3.5–5.5)
ALBUMIN/GLOB SERPL: 1.6 {RATIO} (ref 1.2–2.2)
ALP SERPL-CCNC: 68 IU/L (ref 39–117)
ALT SERPL-CCNC: 9 IU/L (ref 0–32)
AST SERPL-CCNC: 18 IU/L (ref 0–40)
BILIRUB SERPL-MCNC: 0.3 MG/DL (ref 0–1.2)
BUN SERPL-MCNC: 13 MG/DL (ref 6–24)
BUN/CREAT SERPL: 18 (ref 9–23)
CALCIUM SERPL-MCNC: 9.7 MG/DL (ref 8.7–10.2)
CHLORIDE SERPL-SCNC: 105 MMOL/L (ref 96–106)
CO2 SERPL-SCNC: 27 MMOL/L (ref 20–29)
CREAT SERPL-MCNC: 0.72 MG/DL (ref 0.57–1)
ERYTHROCYTE [DISTWIDTH] IN BLOOD BY AUTOMATED COUNT: 14.4 % (ref 12.3–15.4)
GLOBULIN SER CALC-MCNC: 2.6 G/DL (ref 1.5–4.5)
GLUCOSE SERPL-MCNC: 89 MG/DL (ref 65–99)
HCT VFR BLD AUTO: 35.9 % (ref 34–46.6)
HGB BLD-MCNC: 12 G/DL (ref 11.1–15.9)
MCH RBC QN AUTO: 29.5 PG (ref 26.6–33)
MCHC RBC AUTO-ENTMCNC: 33.4 G/DL (ref 31.5–35.7)
MCV RBC AUTO: 88 FL (ref 79–97)
PLATELET # BLD AUTO: 273 X10E3/UL (ref 150–379)
POTASSIUM SERPL-SCNC: 4.8 MMOL/L (ref 3.5–5.2)
PROT SERPL-MCNC: 6.8 G/DL (ref 6–8.5)
RBC # BLD AUTO: 4.07 X10E6/UL (ref 3.77–5.28)
SODIUM SERPL-SCNC: 141 MMOL/L (ref 134–144)
WBC # BLD AUTO: 4.3 X10E3/UL (ref 3.4–10.8)

## 2019-04-14 NOTE — PROGRESS NOTES
HISTORY OF PRESENT ILLNESS Brad Matute is a 63 y.o. female. HPI Follow up on chronic medical problems. Overall feeling well. Hypertension Review: 
Compliant w/ low salt diet, and some exercise. Tolerating Norvasc without problems. No home bp monitoring. No swelling, headache or dizziness. No chest pain, SOB, palpitations. Sleeping better with using the xanax. Glucose intolerance reveiw: She has IGT. Diabetic ROS - further diabetic ROS: no polyuria or polydipsia, no chest pain, dyspnea or TIA's, no numbness, tingling or pain in extremities, no unusual visual symptoms. Lab review: orders written for new lab studies as appropriate; see orders. Recent b/l mastectomy for hx of breast cancer October 2015. Overall recovering well. Patient Active Problem List  
Diagnosis Code  [facility]  Breast cancer genetic susceptibility Z15.01  
 Hypovitaminosis D E55.9  Osteoporosis M81.0  Hypertension I10  
 Insomnia G47.00  
 HX: breast cancer Z85.3  
 Encounter for medication monitoring Z51.81  
 IGT (impaired glucose tolerance) R73.02  
 S/P bilateral mastectomy Z90.13 Current Outpatient Medications Medication Sig Dispense Refill  ALPRAZolam (XANAX) 0.25 mg tablet TAKE 1 TABLET BY MOUTH AT BEDTIME AS NEEDED FOR SLEEP 30 Tab 3  
 amLODIPine (NORVASC) 2.5 mg tablet TAKE 1 TABLET BY MOUTH EVERY DAY 30 Tab 11  Cholecalciferol, Vitamin D3, (VITAMIN D3) 1,000 unit chew Take 1 Tab by mouth daily.  CALCIUM CARBONATE/VITAMIN D3 (CALCIUM CHEWABLE PLUS PO) Take 1 Tab by mouth daily.  docusate sodium (COLACE) 100 mg capsule TAKE 1 CAPSULE BY MOUTH TWICE A DAY AS NEEDED FOR CONSTIPATION  0  
 acetaminophen (TYLENOL EXTRA STRENGTH) 500 mg tablet Take 1,000 mg by mouth every six (6) hours as needed for Pain.  mometasone (NASONEX) 50 mcg/actuation nasal spray 2 Sprays by Both Nostrils route daily.  1 Container 11  
  LORATADINE/PSEUDOEPHEDRINE SUL (CLARITIN-D 24 HOUR PO) Take 1 Tab by mouth daily as needed. Allergies Allergen Reactions  Compazine [Prochlorperazine] Itching  Fentanyl Nausea Only  Neulasta [Pegfilgrastim] Other (comments) Severe pain  Percocet [Oxycodone-Acetaminophen] Other (comments) Hallucinations  Reglan [Metoclopramide] Other (comments) Muscle tension  Sulfa (Sulfonamide Antibiotics) Nausea Only  Zofran [Ondansetron Hcl (Pf)] Itching Past Medical History:  
Diagnosis Date  Breast cancer genetic susceptibility  Cancer [facility]) 1991  
 right breast  
 Cancer ([address] 1994  
 reoccurence in the lymph nodes.  Cancer [facility]) 2008  
 left breast  
 Chemotherapy 2008 and 1994  Hypertension Past Surgical History:  
Procedure Laterality Date  ENDOSCOPY, COLON, DIAGNOSTIC  4/10  
 manetas. repeat in5 yrs. [address]  
 right  HX BREAST LUMPECTOMY  2008  
 left breast  
 HX BREAST RECONSTRUCTION  10/5/2015  HX BREAST RECONSTRUCTION  12/07/2016  
 completed  HX MASTECTOMY  10/5/2015 Bilateral  
 HX OOPHORECTOMY  1/28/2013 Family History Problem Relation Age of Onset  Heart Disease Mother  Hypertension Mother  No Known Problems Father  Diabetes Maternal Grandmother  No Known Problems Maternal Grandfather  No Known Problems Paternal Grandmother  No Known Problems Paternal Grandfather Social History Tobacco Use  Smoking status: Never Smoker  Smokeless tobacco: Never Used Substance Use Topics  Alcohol use: No  
  Alcohol/week: 0.0 oz Lab Results Component Value Date/Time WBC 4.3 12/14/2018 08:54 AM  
 HGB 12.0 12/14/2018 08:54 AM  
 HCT 35.9 12/14/2018 08:54 AM  
 PLATELET 596 70/00/9358 08:54 AM  
 MCV 88 12/14/2018 08:54 AM  
 
 
Lab Results Component Value Date/Time Cholesterol, total 201 (H) 06/11/2018 11:29 AM  
 HDL Cholesterol 85 06/11/2018 11:29 AM  
 LDL, calculated 104 (H) 06/11/2018 11:29 AM  
 Triglyceride 58 06/11/2018 11:29 AM  
 
 
Lab Results Component Value Date/Time Sodium 141 12/14/2018 08:54 AM  
 Potassium 4.8 12/14/2018 08:54 AM  
 Chloride 105 12/14/2018 08:54 AM  
 CO2 27 12/14/2018 08:54 AM  
 Glucose 89 12/14/2018 08:54 AM  
 BUN 13 12/14/2018 08:54 AM  
 Creatinine 0.72 12/14/2018 08:54 AM  
 BUN/Creatinine ratio 18 12/14/2018 08:54 AM  
 GFR est  12/14/2018 08:54 AM  
 GFR est non-AA 93 12/14/2018 08:54 AM  
 Calcium 9.7 12/14/2018 08:54 AM  
 Bilirubin, total 0.3 12/14/2018 08:54 AM  
 ALT (SGPT) 9 12/14/2018 08:54 AM  
 AST (SGOT) 18 12/14/2018 08:54 AM  
 Alk. phosphatase 68 12/14/2018 08:54 AM  
 Protein, total 6.8 12/14/2018 08:54 AM  
 Albumin 4.2 12/14/2018 08:54 AM  
 A-G Ratio 1.6 12/14/2018 08:54 AM  
  
Lab Results Component Value Date/Time Hemoglobin A1c 5.9 (H) 02/25/2014 09:06 AM  
 Hemoglobin A1c (POC) 5.6 12/14/2018 08:54 AM  
   
 
Review of Systems Constitutional: Negative for malaise/fatigue. HENT: Negative for congestion. Eyes: Negative for blurred vision. Respiratory: Negative for cough and shortness of breath. Cardiovascular: Negative for chest pain, palpitations and leg swelling. Gastrointestinal: Negative for abdominal pain, constipation and heartburn. Genitourinary: Negative for dysuria, frequency and urgency. Musculoskeletal: Negative for back pain and joint pain. Neurological: Negative for dizziness, tingling and headaches. Endo/Heme/Allergies: Positive for environmental allergies. Psychiatric/Behavioral: Negative for depression. The patient does not have insomnia. Physical Exam  
Constitutional: She appears well-developed and well-nourished.   
/82   Pulse 72   Temp 96.9 °F (36.1 °C) (Oral)   Resp 16   Ht 5' 6\" (1.676 m)   Wt 198 lb 6.4 oz (90 kg)   LMP 01/01/1994   SpO2 99%   BMI 32.02 kg/m² HENT:  
Right Ear: Tympanic membrane and ear canal normal.  
Left Ear: Tympanic membrane and ear canal normal.  
Nose: Mucosal edema and rhinorrhea present. Mouth/Throat: Mucous membranes are normal. Posterior oropharyngeal erythema present. No oropharyngeal exudate. Neck: Trachea normal, normal range of motion and full passive range of motion without pain. Neck supple. No edema present. No thyromegaly present. Cardiovascular: Normal rate, regular rhythm and normal heart sounds. Exam reveals no gallop. Pulmonary/Chest: Effort normal and breath sounds normal. She has no wheezes. She has no rales. Abdominal: Soft. Normal appearance and bowel sounds are normal. She exhibits no mass. There is no tenderness. Musculoskeletal: Normal range of motion. She exhibits no edema. Lymphadenopathy:  
  She has no cervical adenopathy. Skin: Skin is warm and dry. Psychiatric: She has a normal mood and affect. Nursing note and vitals reviewed. ASSESSMENT and PLAN Diagnoses and all orders for this visit: 1. Essential hypertension Stable Discussed sodium restriction, high k rich diet, maintaining ideal body weight and regular exercise program such as daily walking 30 min perday 4-5 times per week, as physiologic means to achieve blood pressure control.  Medication compliance advised. 2. IGT (impaired glucose tolerance) Continue to monitor. Work on diet and exercise. 3. Environmental allergies Stable with prn nasonex and claritin 4. Primary insomnia -     Refill ALPRAZolam (XANAX) 0.25 mg tablet; TAKE 1 TABLET BY MOUTH AT BEDTIME AS NEEDED FOR SLEEP 5. Encounter for medication monitoring 6. Non morbid obesity I have reviewed/discussed the above normal BMI with the patient. I have recommended the following interventions: dietary management education, guidance, and counseling . Follow-up and Dispositions · Return in about 6 months (around 10/15/2019). reviewed diet, exercise and weight control 
cardiovascular risk and specific lipid/LDL goals reviewed 
reviewed medications and side effects in detail 
specific diabetic recommendations: low cholesterol diet, weight control and daily exercise discussed and glycohemoglobin and other lab monitoring discussed I have discussed diagnosis listed in this note with pt and/or family. I have discussed treatment plans and options and the risk/benefit analysis of those options, including safe use of medications and possible medication side effects. Through the use of shared decision making we have agreed to the above plan. The patient has received an after-visit summary and questions were answered concerning future plans and follow up. Advise pt of any urgent changes then to proceed to the ER.

## 2019-04-15 ENCOUNTER — OFFICE VISIT (OUTPATIENT)
Dept: FAMILY MEDICINE CLINIC | Age: 59
End: 2019-04-15

## 2019-04-15 VITALS
OXYGEN SATURATION: 97 % | HEART RATE: 72 BPM | DIASTOLIC BLOOD PRESSURE: 78 MMHG | TEMPERATURE: 97.5 F | BODY MASS INDEX: 31.43 KG/M2 | WEIGHT: 195.6 LBS | HEIGHT: 66 IN | SYSTOLIC BLOOD PRESSURE: 128 MMHG | RESPIRATION RATE: 16 BRPM

## 2019-04-15 DIAGNOSIS — F51.01 PRIMARY INSOMNIA: ICD-10-CM

## 2019-04-15 DIAGNOSIS — E66.9 NON MORBID OBESITY: ICD-10-CM

## 2019-04-15 DIAGNOSIS — Z91.09 ENVIRONMENTAL ALLERGIES: ICD-10-CM

## 2019-04-15 DIAGNOSIS — Z51.81 ENCOUNTER FOR MEDICATION MONITORING: ICD-10-CM

## 2019-04-15 DIAGNOSIS — R73.02 IGT (IMPAIRED GLUCOSE TOLERANCE): ICD-10-CM

## 2019-04-15 DIAGNOSIS — I10 ESSENTIAL HYPERTENSION: Primary | ICD-10-CM

## 2019-04-15 RX ORDER — MOMETASONE FUROATE 50 UG/1
2 SPRAY, METERED NASAL
COMMUNITY
End: 2020-07-31 | Stop reason: ALTCHOICE

## 2019-04-15 RX ORDER — ACETAMINOPHEN 500 MG
1000 TABLET ORAL
COMMUNITY
End: 2019-04-15 | Stop reason: SDUPTHER

## 2019-04-15 RX ORDER — MOMETASONE FUROATE 50 UG/1
2 SPRAY, METERED NASAL
COMMUNITY
Start: 2013-11-19 | End: 2019-04-15 | Stop reason: SDUPTHER

## 2019-04-15 RX ORDER — CALCIUM CARBONATE/VITAMIN D3 500 MG-10
1 TABLET ORAL DAILY
COMMUNITY
End: 2020-07-31 | Stop reason: ALTCHOICE

## 2019-04-15 RX ORDER — CALCIUM CARBONATE 300MG(750)
1 TABLET,CHEWABLE ORAL
COMMUNITY
End: 2019-04-15

## 2019-04-15 RX ORDER — ALPRAZOLAM 0.25 MG/1
TABLET ORAL
Qty: 30 TAB | Refills: 3 | Status: SHIPPED | OUTPATIENT
Start: 2019-04-15 | End: 2020-07-31 | Stop reason: SDUPTHER

## 2019-04-15 RX ORDER — AMLODIPINE BESYLATE 2.5 MG/1
2.5 TABLET ORAL
COMMUNITY
Start: 2017-12-15 | End: 2019-04-15 | Stop reason: SDUPTHER

## 2019-04-15 NOTE — PROGRESS NOTES
Chief Complaint Patient presents with  Hypertension  
  follow up 1. Have you been to the ER, urgent care clinic since your last visit? Hospitalized since your last visit? NO 
 
2. Have you seen or consulted any other health care providers outside of the [address] [place] [address] since your last visit? Include any pap smears or colon screening. YES, GYN in March Dr. Rhianna Arguello Concerns today: no

## 2019-08-14 RX ORDER — AMLODIPINE BESYLATE 2.5 MG/1
TABLET ORAL
Qty: 90 TAB | Refills: 3 | Status: SHIPPED | OUTPATIENT
Start: 2019-08-14 | End: 2020-08-07 | Stop reason: SDUPTHER

## 2019-10-15 ENCOUNTER — OFFICE VISIT (OUTPATIENT)
Dept: FAMILY MEDICINE CLINIC | Age: 59
End: 2019-10-15

## 2019-10-15 VITALS
TEMPERATURE: 96.6 F | HEIGHT: 66 IN | OXYGEN SATURATION: 100 % | WEIGHT: 197 LBS | BODY MASS INDEX: 31.66 KG/M2 | SYSTOLIC BLOOD PRESSURE: 136 MMHG | HEART RATE: 66 BPM | DIASTOLIC BLOOD PRESSURE: 84 MMHG | RESPIRATION RATE: 16 BRPM

## 2019-10-15 DIAGNOSIS — N95.1 POST MENOPAUSAL SYNDROME: ICD-10-CM

## 2019-10-15 DIAGNOSIS — F51.01 PRIMARY INSOMNIA: ICD-10-CM

## 2019-10-15 DIAGNOSIS — Z51.81 ENCOUNTER FOR MEDICATION MONITORING: ICD-10-CM

## 2019-10-15 DIAGNOSIS — R73.02 IGT (IMPAIRED GLUCOSE TOLERANCE): ICD-10-CM

## 2019-10-15 DIAGNOSIS — I10 ESSENTIAL HYPERTENSION: Primary | ICD-10-CM

## 2019-10-15 DIAGNOSIS — Z23 ENCOUNTER FOR IMMUNIZATION: ICD-10-CM

## 2019-10-15 DIAGNOSIS — Z23 NEED FOR SHINGLES VACCINE: ICD-10-CM

## 2019-10-15 DIAGNOSIS — E66.9 NON MORBID OBESITY: ICD-10-CM

## 2019-10-15 DIAGNOSIS — Z91.09 ENVIRONMENTAL ALLERGIES: ICD-10-CM

## 2019-10-15 LAB — HBA1C MFR BLD HPLC: 5.5 %

## 2019-10-15 RX ORDER — VITAMIN E 268 MG
400 CAPSULE ORAL DAILY
COMMUNITY

## 2019-10-15 NOTE — PROGRESS NOTES
Order placed for Flu shot per Verbal Order from Dr. Katiana Gong on 10/15/2019 due to need    Flu shot 0.5 ml, given in left arm IM, no reaction noted. Terry De Paz

## 2019-10-15 NOTE — PROGRESS NOTES
Chief Complaint   Patient presents with    Hypertension     follow up    Nasal Congestion     x 2 weeks    Other     patient c/o sinus drainage       Mammogram 4/1/2015. Patient has had bilateral mastectomy. Colonoscopy 5/20/2015 by Dr. Janie Gilliam repeat in 5 years      1. Have you been to the ER, urgent care clinic since your last visit? Hospitalized since your last visit? No    2. Have you seen or consulted any other health care providers outside of the [address] [place] [address] since your last visit? Include any pap smears or colon screening.   No

## 2019-10-16 LAB
ALBUMIN SERPL-MCNC: 4.5 G/DL (ref 3.5–5.5)
ALBUMIN/GLOB SERPL: 1.8 {RATIO} (ref 1.2–2.2)
ALP SERPL-CCNC: 74 IU/L (ref 39–117)
ALT SERPL-CCNC: 12 IU/L (ref 0–32)
AST SERPL-CCNC: 16 IU/L (ref 0–40)
BILIRUB SERPL-MCNC: 0.3 MG/DL (ref 0–1.2)
BUN SERPL-MCNC: 14 MG/DL (ref 6–24)
BUN/CREAT SERPL: 20 (ref 9–23)
CALCIUM SERPL-MCNC: 9.3 MG/DL (ref 8.7–10.2)
CHLORIDE SERPL-SCNC: 102 MMOL/L (ref 96–106)
CHOLEST SERPL-MCNC: 197 MG/DL (ref 100–199)
CO2 SERPL-SCNC: 23 MMOL/L (ref 20–29)
CREAT SERPL-MCNC: 0.71 MG/DL (ref 0.57–1)
ERYTHROCYTE [DISTWIDTH] IN BLOOD BY AUTOMATED COUNT: 12.9 % (ref 12.3–15.4)
GLOBULIN SER CALC-MCNC: 2.5 G/DL (ref 1.5–4.5)
GLUCOSE SERPL-MCNC: 84 MG/DL (ref 65–99)
HCT VFR BLD AUTO: 37.6 % (ref 34–46.6)
HDLC SERPL-MCNC: 90 MG/DL
HGB BLD-MCNC: 12.7 G/DL (ref 11.1–15.9)
INTERPRETATION, 910389: NORMAL
LDLC SERPL CALC-MCNC: 97 MG/DL (ref 0–99)
MCH RBC QN AUTO: 30 PG (ref 26.6–33)
MCHC RBC AUTO-ENTMCNC: 33.8 G/DL (ref 31.5–35.7)
MCV RBC AUTO: 89 FL (ref 79–97)
PLATELET # BLD AUTO: 242 X10E3/UL (ref 150–450)
POTASSIUM SERPL-SCNC: 5 MMOL/L (ref 3.5–5.2)
PROT SERPL-MCNC: 7 G/DL (ref 6–8.5)
RBC # BLD AUTO: 4.23 X10E6/UL (ref 3.77–5.28)
SODIUM SERPL-SCNC: 140 MMOL/L (ref 134–144)
TRIGL SERPL-MCNC: 50 MG/DL (ref 0–149)
VLDLC SERPL CALC-MCNC: 10 MG/DL (ref 5–40)
WBC # BLD AUTO: 3.8 X10E3/UL (ref 3.4–10.8)

## 2020-07-31 ENCOUNTER — OFFICE VISIT (OUTPATIENT)
Dept: FAMILY MEDICINE CLINIC | Age: 60
End: 2020-07-31

## 2020-07-31 VITALS
TEMPERATURE: 97.7 F | BODY MASS INDEX: 32.11 KG/M2 | OXYGEN SATURATION: 98 % | SYSTOLIC BLOOD PRESSURE: 132 MMHG | DIASTOLIC BLOOD PRESSURE: 82 MMHG | RESPIRATION RATE: 18 BRPM | HEIGHT: 66 IN | HEART RATE: 64 BPM | WEIGHT: 199.8 LBS

## 2020-07-31 DIAGNOSIS — E66.9 NON MORBID OBESITY: ICD-10-CM

## 2020-07-31 DIAGNOSIS — Z51.81 ENCOUNTER FOR MEDICATION MONITORING: ICD-10-CM

## 2020-07-31 DIAGNOSIS — R73.02 IGT (IMPAIRED GLUCOSE TOLERANCE): ICD-10-CM

## 2020-07-31 DIAGNOSIS — I10 ESSENTIAL HYPERTENSION: Primary | ICD-10-CM

## 2020-07-31 DIAGNOSIS — Z91.09 ENVIRONMENTAL ALLERGIES: ICD-10-CM

## 2020-07-31 DIAGNOSIS — F51.01 PRIMARY INSOMNIA: ICD-10-CM

## 2020-07-31 RX ORDER — ALPRAZOLAM 0.25 MG/1
TABLET ORAL
Qty: 30 TAB | Refills: 3 | Status: SHIPPED | OUTPATIENT
Start: 2020-07-31 | End: 2022-01-10 | Stop reason: ALTCHOICE

## 2020-07-31 NOTE — PROGRESS NOTES
Chief Complaint   Patient presents with    Hypertension     folow up     1. Have you been to the ER, urgent care clinic since your last visit? Hospitalized since your last visit? NO  2. Have you seen or consulted any other health care providers outside of the [address] [place] [address] since your last visit? Include any pap smears or colon screening.   NO    Colonoscopy 6/2020  10 years    Mammogram Gyn performed exam 2020  Dr. Karina Cox  / [facility]    PAP scheduled for next year Dr. Kairna Cox / [facility] Smith

## 2020-07-31 NOTE — PROGRESS NOTES
HISTORY OF PRESENT ILLNESS  Madison Maguire is a 62 y.o. female. HPI   Follow up on chronic medical problems. Doing the precautionary measures at home to reduce risks of exposure COVID19. Also wearing mask when she is going out. No known sick contacts or known exposure to [address]. Overall feeling well. Hypertension Review:  Compliant w/ low salt diet, and some exercise. Tolerating Norvasc without problems. No home bp monitoring. No swelling, headache or dizziness. No chest pain, SOB, palpitations. Sleeping better with using the xanax. Glucose intolerance reveiw:  She has IGT. Diabetic ROS - further diabetic ROS: no polyuria or polydipsia, no chest pain, dyspnea or TIA's, no numbness, tingling or pain in extremities, no unusual visual symptoms. Lab review: orders written for new lab studies as appropriate; see orders. S/p b/l mastectomy for hx of breast cancer October 2015. Overall recovering well. Patient Active Problem List   Diagnosis Code    [facility] ([facility] C80.1    Breast cancer genetic susceptibility Z15.01    Hypovitaminosis D E55.9    Osteoporosis M81.0    Hypertension I10    Insomnia G47.00    HX: breast cancer Z85.3    Encounter for medication monitoring Z51.81    IGT (impaired glucose tolerance) R73.02    S/P bilateral mastectomy Z90.13       Current Outpatient Medications   Medication Sig Dispense Refill    vitamin E (AQUA GEMS) 400 unit capsule Take 400 Units by mouth daily.  amLODIPine (NORVASC) 2.5 mg tablet TAKE 1 TABLET BY MOUTH EVERY DAY 90 Tab 3    ALPRAZolam (XANAX) 0.25 mg tablet TAKE 1 TABLET BY MOUTH AT BEDTIME AS NEEDED FOR SLEEP 30 Tab 3    Calcium-Cholecalciferol, D3, 500 mg(1,250mg) -400 unit tab Take 1 Tab by mouth daily.  mometasone (NASONEX) 50 mcg/actuation nasal spray 2 Sprays by Both Nostrils route daily as needed.  CALCIUM CARBONATE/VITAMIN D3 (CALCIUM CHEWABLE PLUS PO) Take 1 Tab by mouth daily.       docusate sodium (COLACE) 100 mg capsule TAKE 1 CAPSULE BY MOUTH TWICE A DAY AS NEEDED FOR CONSTIPATION  0    acetaminophen (TYLENOL EXTRA STRENGTH) 500 mg tablet Take 1,000 mg by mouth every six (6) hours as needed for Pain.  LORATADINE/PSEUDOEPHEDRINE SUL (CLARITIN-D 24 HOUR PO) Take 1 Tab by mouth daily as needed. Allergies   Allergen Reactions    Compazine [Prochlorperazine] Itching    Fentanyl Nausea Only    Neulasta [Pegfilgrastim] Other (comments)     Severe pain    Percocet [Oxycodone-Acetaminophen] Other (comments)     Hallucinations    Reglan [Metoclopramide] Other (comments)     Muscle tension    Sulfa (Sulfonamide Antibiotics) Nausea Only    Zofran [Ondansetron Hcl (Pf)] Itching       Past Medical History:   Diagnosis Date    Breast cancer genetic susceptibility     [facility] 1991    right breast    Cancer ([facility] 1994    reoccurence in the lymph nodes.  Cancer [facility]) 2008    left breast    Chemotherapy     2008 and 1994    Hypertension        Past Surgical History:   Procedure Laterality Date    ENDOSCOPY, COLON, DIAGNOSTIC  4/10    manetas. repeat in5 yrs.     HC STEM CELL TRNSPL AUTOLOGOUS  1994    HX BREAST LUMPECTOMY  1994    right    HX BREAST LUMPECTOMY  2008    left breast    HX BREAST RECONSTRUCTION  10/5/2015    HX BREAST RECONSTRUCTION  12/07/2016    completed    HX MASTECTOMY  10/5/2015    Bilateral    HX OOPHORECTOMY  1/28/2013       Family History   Problem Relation Age of Onset    Heart Disease Mother     Hypertension Mother     No Known Problems Father     Diabetes Maternal Grandmother     No Known Problems Maternal Grandfather     No Known Problems Paternal Grandmother     No Known Problems Paternal Grandfather        Social History     Tobacco Use    Smoking status: Never Smoker    Smokeless tobacco: Never Used   Substance Use Topics    Alcohol use: No     Alcohol/week: 0.0 standard drinks        Lab Results   Component Value Date/Time    WBC 3.8 10/15/2019 10:44 AM    HGB 12.7 10/15/2019 10:44 AM    HCT 37.6 10/15/2019 10:44 AM    PLATELET 620 58/99/4438 10:44 AM    MCV 89 10/15/2019 10:44 AM     Lab Results   Component Value Date/Time    Cholesterol, total 197 10/15/2019 10:44 AM    HDL Cholesterol 90 10/15/2019 10:44 AM    LDL, calculated 97 10/15/2019 10:44 AM    Triglyceride 50 10/15/2019 10:44 AM     Lab Results   Component Value Date/Time    Sodium 140 10/15/2019 10:44 AM    Potassium 5.0 10/15/2019 10:44 AM    Chloride 102 10/15/2019 10:44 AM    CO2 23 10/15/2019 10:44 AM    Glucose 84 10/15/2019 10:44 AM    BUN 14 10/15/2019 10:44 AM    Creatinine 0.71 10/15/2019 10:44 AM    BUN/Creatinine ratio 20 10/15/2019 10:44 AM    GFR est  10/15/2019 10:44 AM    GFR est non-AA 94 10/15/2019 10:44 AM    Calcium 9.3 10/15/2019 10:44 AM    Bilirubin, total 0.3 10/15/2019 10:44 AM    ALT (SGPT) 12 10/15/2019 10:44 AM    Alk. phosphatase 74 10/15/2019 10:44 AM    Protein, total 7.0 10/15/2019 10:44 AM    Albumin 4.5 10/15/2019 10:44 AM    A-G Ratio 1.8 10/15/2019 10:44 AM      Lab Results   Component Value Date/Time    Hemoglobin A1c 5.9 (H) 02/25/2014 09:06 AM    Hemoglobin A1c (POC) 5.5 10/15/2019 10:44 AM         Review of Systems   Constitutional: Negative for malaise/fatigue. HENT: Negative for congestion. Eyes: Negative for blurred vision. Respiratory: Negative for cough and shortness of breath. Cardiovascular: Negative for chest pain, palpitations and leg swelling. Gastrointestinal: Negative for abdominal pain, constipation and heartburn. Genitourinary: Negative for dysuria, frequency and urgency. Musculoskeletal: Negative for back pain and joint pain. Neurological: Negative for dizziness, tingling and headaches. Endo/Heme/Allergies: Negative for environmental allergies. Psychiatric/Behavioral: Negative for depression. The patient does not have insomnia. Physical Exam  Vitals signs and nursing note reviewed.    Constitutional: Appearance: Normal appearance. She is well-developed. Comments: /82   Pulse 64   Temp 97.7 °F (36.5 °C) (Temporal)   Resp 18   Ht 5' 6\" (1.676 m)   Wt 199 lb 12.8 oz (90.6 kg)   LMP 01/01/1994   SpO2 98%   BMI 32.25 kg/m²    HENT:      Right Ear: Tympanic membrane and ear canal normal.      Left Ear: Tympanic membrane and ear canal normal.      Nose: No mucosal edema or rhinorrhea. Neck:      Musculoskeletal: Normal range of motion and neck supple. Thyroid: No thyromegaly. Cardiovascular:      Rate and Rhythm: Normal rate and regular rhythm. Heart sounds: Normal heart sounds. No gallop. Pulmonary:      Effort: Pulmonary effort is normal.      Breath sounds: Normal breath sounds. Abdominal:      General: Bowel sounds are normal.      Palpations: Abdomen is soft. There is no mass. Tenderness: There is no abdominal tenderness. Musculoskeletal: Normal range of motion. General: No swelling. Lymphadenopathy:      Cervical: No cervical adenopathy. Skin:     General: Skin is warm and dry. Neurological:      General: No focal deficit present. Mental Status: She is alert and oriented to person, place, and time. Psychiatric:         Mood and Affect: Mood normal.         ASSESSMENT and PLAN  Diagnoses and all orders for this visit:    1. Essential hypertension  Discussed sodium restriction, high k rich diet, maintaining ideal body weight and regular exercise program such as daily walking 30 min perday 4-5 times per week, as physiologic means to achieve blood pressure control.  Medication compliance advised. -     LIPID PANEL    2. IGT (impaired glucose tolerance)  Continue to monitor. Work on diet and exercise. 3. Primary insomnia  -     ALPRAZolam (XANAX) 0.25 mg tablet; TAKE 1 TABLET BY MOUTH AT BEDTIME AS NEEDED FOR SLEEP    4. Environmental allergies  Stable     5.  Encounter for medication monitoring  -     CBC W/O DIFF  -     METABOLIC PANEL, COMPREHENSIVE    6. Non morbid obesity  I have reviewed/discussed the above normal BMI with the patient. I have recommended the following interventions: dietary management education, guidance, and counseling . Follow-up and Dispositions    · Return in about 6 months (around 1/31/2021). reviewed diet, exercise and weight control  cardiovascular risk and specific lipid/LDL goals reviewed  reviewed medications and side effects in detail  specific diabetic recommendations: low cholesterol diet, weight control and daily exercise discussed and glycohemoglobin and other lab monitoring discussed     I have discussed diagnosis listed in this note with pt and/or family. I have discussed treatment plans and options and the risk/benefit analysis of those options, including safe use of medications and possible medication side effects. Through the use of shared decision making we have agreed to the above plan. The patient has received an after-visit summary and questions were answered concerning future plans and follow up. Advise pt of any urgent changes then to proceed to the ER.

## 2020-08-01 LAB
ALBUMIN SERPL-MCNC: 4.7 G/DL (ref 3.8–4.9)
ALBUMIN/GLOB SERPL: 1.8 {RATIO} (ref 1.2–2.2)
ALP SERPL-CCNC: 73 IU/L (ref 39–117)
ALT SERPL-CCNC: 9 IU/L (ref 0–32)
AST SERPL-CCNC: 19 IU/L (ref 0–40)
BILIRUB SERPL-MCNC: 0.2 MG/DL (ref 0–1.2)
BUN SERPL-MCNC: 11 MG/DL (ref 6–24)
BUN/CREAT SERPL: 14 (ref 9–23)
CALCIUM SERPL-MCNC: 10 MG/DL (ref 8.7–10.2)
CHLORIDE SERPL-SCNC: 101 MMOL/L (ref 96–106)
CHOLEST SERPL-MCNC: 222 MG/DL (ref 100–199)
CO2 SERPL-SCNC: 24 MMOL/L (ref 20–29)
CREAT SERPL-MCNC: 0.8 MG/DL (ref 0.57–1)
ERYTHROCYTE [DISTWIDTH] IN BLOOD BY AUTOMATED COUNT: 13 % (ref 11.7–15.4)
GLOBULIN SER CALC-MCNC: 2.6 G/DL (ref 1.5–4.5)
GLUCOSE SERPL-MCNC: 88 MG/DL (ref 65–99)
HCT VFR BLD AUTO: 39.4 % (ref 34–46.6)
HDLC SERPL-MCNC: 87 MG/DL
HGB BLD-MCNC: 13.1 G/DL (ref 11.1–15.9)
INTERPRETATION, 910389: NORMAL
LDLC SERPL CALC-MCNC: 118 MG/DL (ref 0–99)
MCH RBC QN AUTO: 29.5 PG (ref 26.6–33)
MCHC RBC AUTO-ENTMCNC: 33.2 G/DL (ref 31.5–35.7)
MCV RBC AUTO: 89 FL (ref 79–97)
PLATELET # BLD AUTO: 271 X10E3/UL (ref 150–450)
POTASSIUM SERPL-SCNC: 4.7 MMOL/L (ref 3.5–5.2)
PROT SERPL-MCNC: 7.3 G/DL (ref 6–8.5)
RBC # BLD AUTO: 4.44 X10E6/UL (ref 3.77–5.28)
SODIUM SERPL-SCNC: 141 MMOL/L (ref 134–144)
TRIGL SERPL-MCNC: 83 MG/DL (ref 0–149)
VLDLC SERPL CALC-MCNC: 17 MG/DL (ref 5–40)
WBC # BLD AUTO: 4.8 X10E3/UL (ref 3.4–10.8)

## 2020-08-07 RX ORDER — AMLODIPINE BESYLATE 2.5 MG/1
TABLET ORAL
Qty: 90 TAB | Refills: 3 | Status: SHIPPED | OUTPATIENT
Start: 2020-08-07 | End: 2021-07-28

## 2020-08-07 NOTE — TELEPHONE ENCOUNTER
Last visit:7/31/20  Next visit:2/1/21  Previous refill 8/14/19(90+3R)    Requested Prescriptions     Pending Prescriptions Disp Refills    amLODIPine (NORVASC) 2.5 mg tablet 90 Tab 3     Sig: Take 1 tablet by mouth daily

## 2020-10-21 ENCOUNTER — IMMUNIZATION CLINIC (OUTPATIENT)
Dept: FAMILY MEDICINE CLINIC | Age: 60
End: 2020-10-21

## 2020-10-21 DIAGNOSIS — Z23 ENCOUNTER FOR IMMUNIZATION: ICD-10-CM

## 2020-10-21 PROCEDURE — 90686 IIV4 VACC NO PRSV 0.5 ML IM: CPT

## 2021-02-24 ENCOUNTER — OFFICE VISIT (OUTPATIENT)
Dept: FAMILY MEDICINE CLINIC | Age: 61
End: 2021-02-24
Payer: COMMERCIAL

## 2021-02-24 VITALS
RESPIRATION RATE: 16 BRPM | HEIGHT: 66 IN | BODY MASS INDEX: 32.14 KG/M2 | SYSTOLIC BLOOD PRESSURE: 130 MMHG | HEART RATE: 72 BPM | OXYGEN SATURATION: 97 % | TEMPERATURE: 97.5 F | DIASTOLIC BLOOD PRESSURE: 80 MMHG | WEIGHT: 200 LBS

## 2021-02-24 DIAGNOSIS — E66.9 NON MORBID OBESITY: ICD-10-CM

## 2021-02-24 DIAGNOSIS — Z51.81 ENCOUNTER FOR MEDICATION MONITORING: ICD-10-CM

## 2021-02-24 DIAGNOSIS — R73.02 IGT (IMPAIRED GLUCOSE TOLERANCE): ICD-10-CM

## 2021-02-24 DIAGNOSIS — I10 ESSENTIAL HYPERTENSION: Primary | ICD-10-CM

## 2021-02-24 PROCEDURE — 99213 OFFICE O/P EST LOW 20 MIN: CPT | Performed by: FAMILY MEDICINE

## 2021-02-24 NOTE — PROGRESS NOTES
HISTORY OF PRESENT ILLNESS  Sanna Ryan is a 62 y.o. female. HPI   Follow up on chronic medical problems. Doing the precautionary measures at home to reduce risks of exposure COVID19. Also wearing mask when she is going out. No known sick contacts or known exposure to [address]. Overall feeling well. Hypertension Review:  Compliant w/ low salt diet, and some exercise. Tolerating Norvasc without problems. No home bp monitoring. No swelling, headache or dizziness. No chest pain, SOB, palpitations. Sleeping better with using the xanax. Glucose intolerance reveiw:  She has IGT. Last a1c level was at 5.5 %  Diabetic ROS - further diabetic ROS: no polyuria or polydipsia, no chest pain, dyspnea or TIA's, no numbness, tingling or pain in extremities, no unusual visual symptoms. Lab review: orders written for new lab studies as appropriate; see orders. S/p b/l mastectomy for hx of breast cancer October 2015. Colonoscopy 6/26/2020 by Dr. Latasha Prince repeat in 10 years. Patient Active Problem List   Diagnosis Code    [facility] C80.[facility]    Breast cancer genetic susceptibility Z15.01    Hypovitaminosis D E55.9    Osteoporosis M81.0    Hypertension I10    Insomnia G47.00    HX: breast cancer Z85.3    Encounter for medication monitoring Z51.81    IGT (impaired glucose tolerance) R73.02    S/P bilateral mastectomy Z90.13       Current Outpatient Medications   Medication Sig Dispense Refill    amLODIPine (NORVASC) 2.5 mg tablet Take 1 tablet by mouth daily 90 Tab 3    guaifenesin/phenylephrine HCl (MUCINEX COLD PO) Take  by mouth. Take 1 pill as needed      ALPRAZolam (XANAX) 0.25 mg tablet TAKE 1 TABLET BY MOUTH AT BEDTIME AS NEEDED FOR SLEEP 30 Tab 3    vitamin E (AQUA GEMS) 400 unit capsule Take 400 Units by mouth daily.  CALCIUM CARBONATE/VITAMIN D3 (CALCIUM CHEWABLE PLUS PO) Take 1 Tab by mouth daily.       acetaminophen (TYLENOL EXTRA STRENGTH) 500 mg tablet Take 1,000 mg by mouth every six (6) hours as needed for Pain. Allergies   Allergen Reactions    Compazine [Prochlorperazine] Itching    Fentanyl Nausea Only    Neulasta [Pegfilgrastim] Other (comments)     Severe pain    Percocet [Oxycodone-Acetaminophen] Other (comments)     Hallucinations    Reglan [Metoclopramide] Other (comments)     Muscle tension    Sulfa (Sulfonamide Antibiotics) Nausea Only    Zofran [Ondansetron Hcl (Pf)] Itching         Past Medical History:   Diagnosis Date    Breast cancer genetic susceptibility     Cancer ([facility] 1991    right breast    Cancer ([facility] 1994    reoccurence in the lymph nodes.  Cancer [facility]) 2008    left breast    Chemotherapy     2008 and 1994    Hypertension          Past Surgical History:   Procedure Laterality Date    ENDOSCOPY, COLON, DIAGNOSTIC  4/10    manetas. repeat in5 yrs.     HC STEM CELL TRNSPL AUTOLOGOUS  1994    HX BREAST LUMPECTOMY  1994    right    HX BREAST LUMPECTOMY  2008    left breast    HX BREAST RECONSTRUCTION  10/5/2015    HX BREAST RECONSTRUCTION  12/07/2016    completed    HX MASTECTOMY  10/5/2015    Bilateral    HX OOPHORECTOMY  1/28/2013         Family History   Problem Relation Age of Onset    Heart Disease Mother     Hypertension Mother     No Known Problems Father     Diabetes Maternal Grandmother     No Known Problems Maternal Grandfather     No Known Problems Paternal Grandmother     No Known Problems Paternal Grandfather        Social History     Tobacco Use    Smoking status: Never Smoker    Smokeless tobacco: Never Used   Substance Use Topics    Alcohol use: No     Alcohol/week: 0.0 standard drinks        Lab Results   Component Value Date/Time    WBC 4.8 07/31/2020 12:20 PM    HGB 13.1 07/31/2020 12:20 PM    HCT 39.4 07/31/2020 12:20 PM    PLATELET 262 60/42/7703 12:20 PM    MCV 89 07/31/2020 12:20 PM     Lab Results   Component Value Date/Time    Cholesterol, total 222 (H) 07/31/2020 12:20 PM    HDL Cholesterol 87 07/31/2020 12:20 PM    LDL, calculated 118 (H) 07/31/2020 12:20 PM    Triglyceride 83 07/31/2020 12:20 PM     Lab Results   Component Value Date/Time    Sodium 141 07/31/2020 12:20 PM    Potassium 4.7 07/31/2020 12:20 PM    Chloride 101 07/31/2020 12:20 PM    CO2 24 07/31/2020 12:20 PM    Glucose 88 07/31/2020 12:20 PM    BUN 11 07/31/2020 12:20 PM    Creatinine 0.80 07/31/2020 12:20 PM    BUN/Creatinine ratio 14 07/31/2020 12:20 PM    GFR est AA 93 07/31/2020 12:20 PM    GFR est non-AA 81 07/31/2020 12:20 PM    Calcium 10.0 07/31/2020 12:20 PM    Bilirubin, total 0.2 07/31/2020 12:20 PM    ALT (SGPT) 9 07/31/2020 12:20 PM    Alk. phosphatase 73 07/31/2020 12:20 PM    Protein, total 7.3 07/31/2020 12:20 PM    Albumin 4.7 07/31/2020 12:20 PM    A-G Ratio 1.8 07/31/2020 12:20 PM      Lab Results   Component Value Date/Time    Hemoglobin A1c 5.9 (H) 02/25/2014 09:06 AM    Hemoglobin A1c (POC) 5.5 10/15/2019 10:44 AM         Review of Systems   Constitutional: Negative for malaise/fatigue. HENT: Negative for congestion. Eyes: Negative for blurred vision. Respiratory: Negative for cough and shortness of breath. Cardiovascular: Negative for chest pain, palpitations and leg swelling. Gastrointestinal: Negative for abdominal pain, constipation and heartburn. Genitourinary: Negative for dysuria, frequency and urgency. Musculoskeletal: Negative for back pain and joint pain. Neurological: Negative for dizziness, tingling and headaches. Endo/Heme/Allergies: Negative for environmental allergies. Psychiatric/Behavioral: Negative for depression. The patient does not have insomnia. Physical Exam  Vitals signs and nursing note reviewed. Constitutional:       Appearance: Normal appearance. She is well-developed.       Comments: /80   Pulse 72   Temp 97.5 °F (36.4 °C) (Temporal)   Resp 16   Ht 5' 6\" (1.676 m)   Wt 200 lb (90.7 kg)   LMP 01/01/1994   SpO2 97%   BMI 32.28 kg/m²    HENT: Right Ear: Tympanic membrane and ear canal normal.      Left Ear: Tympanic membrane and ear canal normal.      Nose: No mucosal edema or rhinorrhea. Neck:      Musculoskeletal: Normal range of motion and neck supple. Thyroid: No thyromegaly. Cardiovascular:      Rate and Rhythm: Normal rate and regular rhythm. Heart sounds: Normal heart sounds. No gallop. Pulmonary:      Effort: Pulmonary effort is normal.      Breath sounds: Normal breath sounds. Abdominal:      General: Bowel sounds are normal.      Palpations: Abdomen is soft. There is no mass. Tenderness: There is no abdominal tenderness. Musculoskeletal: Normal range of motion. General: No swelling. Right lower leg: No edema. Left lower leg: No edema. Lymphadenopathy:      Cervical: No cervical adenopathy. Skin:     General: Skin is warm and dry. Neurological:      General: No focal deficit present. Mental Status: She is alert and oriented to person, place, and time. Psychiatric:         Mood and Affect: Mood normal.          ASSESSMENT and PLAN  Diagnoses and all orders for this visit:    1. Essential hypertension  Stable at goal.   Discussed sodium restriction, high k rich diet, maintaining ideal body weight and regular exercise program such as daily walking 30 min perday 4-5 times per week, as physiologic means to achieve blood pressure control. Medication compliance advised. 2. IGT (impaired glucose tolerance)  Continue to monitor. Work on diet and exercise. 3. Encounter for medication monitoring    4. Non morbid obesity  I have reviewed/discussed the above normal BMI with the patient. I have recommended the following interventions: dietary management education, guidance, and counseling . Follow-up and Dispositions    · Return in about 6 months (around 8/24/2021).        current treatment plan is effective, no change in therapy  reviewed diet, exercise and weight control  cardiovascular risk and specific lipid/LDL goals reviewed  reviewed medications and side effects in detail      I have discussed diagnosis listed in this note with pt and/or family. I have discussed treatment plans and options and the risk/benefit analysis of those options, including safe use of medications and possible medication side effects. Through the use of shared decision making we have agreed to the above plan. The patient has received an after-visit summary and questions were answered concerning future plans and follow up. Advise pt of any urgent changes then to proceed to the ER.

## 2021-02-24 NOTE — PROGRESS NOTES
Chief Complaint   Patient presents with    Hypertension     follow up         Mammogram 4/1/2015. Patient has had bilateral mastectomy. Colonoscopy 6/26/2020 by Dr. Rosalinda Robles repeat in 10 years. 1. Have you been to the ER, urgent care clinic since your last visit? Hospitalized since your last visit? No    2. Have you seen or consulted any other health care providers outside of the [address] [place] [address] since your last visit? Include any pap smears or colon screening.   No

## 2021-02-24 NOTE — LETTER
CONTROLLED SUBSTANCE MEDICATION AGREEMENT Patient Name: Scooby Barcenas Patient YOB: 1960 I understand, that controlled substance medications may be used to help better manage my symptoms and to improve my ability to function at home, work and in social settings. However, I also understand that these medications do have risks, which have been discussed with me, including possible development of physical or psychological dependence. I understand that successful treatment requires mutual trust and honesty between me and my provider. I understand and agree that following this Medication Agreement is necessary in continuing my provider-patient relationship and the success of my treatment plan. Explanation from my Provider: Benefits and Goals of Controlled Substance Medications: There are two potential goals for your treatment: (1) decreased pain and suffering (2) improved daily life functions. There are many possible treatments for your chronic condition(s). Alternatives such as physical therapy, yoga, massage, home daily exercise, meditation, relaxation techniques, injections, chiropractic manipulations, surgery, cognitive therapy, hypnosis and many medications that are not habit-forming may be used. Use of controlled substance medications may be helpful, but they are unlikely to resolve all symptoms or restore all function. Explanation from my Provider: Risks of Controlled Substance Medications: 
 Opioid pain medications: These medications can lead to problems such as addiction/dependence, sedation, lightheadedness/dizziness, memory issues, falls, constipation, nausea, or vomiting. They may also impair the ability to drive or operate machinery. Additionally, these medications may lower testosterone levels, leading to loss of bone strength, stamina and sex drive.   They may cause problems with breathing, sleep apnea and reduced coughing, which is especially dangerous for patients with lung disease. Overdose or dangerous interactions with alcohol and other medications may occur, leading to death. Hyperalgesia may develop, which means patients receiving opioids for the treatment of pain may become more sensitive to certain painful stimuli, and in some cases, experience pain from ordinarily non-painful stimuli. Women between the ages of 14-53 who could become pregnant should carefully weigh the risks and benefits of opioids with their physicians, as these medications increase the risk of pregnancy complications, including miscarriage,  delivery and stillbirth. It is also possible for babies to be born addicted to opioids. Opioid dependence withdrawal symptoms may include; feelings of uneasiness, increased pain, irritability, belly pain, diarrhea, sweats and goose-flesh. Testosterone replacement therapy:  Potential side effects include increased risk of stroke and heart attack, blood clots, increased blood pressure, increased cholesterol, enlarged prostate, sleep apnea, irritability/aggression and other mood disorders, and decreased fertility. Estrellita Brock (1960)             Page 1 of 4    Initials:_______ Benzodiazepines and non-benzodiazepine sleep medications: These medications can lead to problems such as addiction/dependence, sedation, fatigue, lightheadedness, dizziness, incoordination, falls, depression, hallucinations, and impaired judgment, memory and concentration. The ability to drive and operate machinery may also be affected. Abnormal sleep-related behaviors have been reported, including sleepwalking, driving, making telephone calls, eating, or having sex while not fully awake. These medications can suppress breathing and worsen sleep apnea, particularly when combined with alcohol or other sedating medications, potentially leading to death. Dependence withdrawal symptoms may include tremors, anxiety, hallucinations and seizures.  
 Stimulants:  Common adverse effects include addiction/dependence, increased blood pressure and heart rate, decreased appetite, nausea, involuntary weight loss, insomnia,  irritability, and headaches. These risks may increase when these medications are combined with other stimulants, such as caffeine pills or energy drinks, certain weight loss supplements and oral decongestants. Dependence withdrawal symptoms may include depressed mood, loss of interest, suicidal thoughts, anxiety, fatigue, appetite changes and agitation. I agree and understand that I and my prescriber have the following rights and responsibilities regarding my treatment plan:  
1. MY RIGHTS: 
To be informed of my treatment and medication plan. To be an active participant in my health and wellbeing. 2. MY RESPONSIBILITY AND UNDERSTANDING FOR USE OF MEDICATIONS  I will take medications at the dose and frequency as directed. For my safety, I will not increase or change how I take my medications without the recommendation of my healthcare provider.  I will actively participate in any program recommended by my provider which may improve function, including social, physical, psychological programs.  I will not take my medications with alcohol or other drugs not prescribed to me. I understand that drinking alcohol with my medications increases the chances of side effects, including reduced breathing rate and could lead to personal injury when operating machinery.  I understand that if I have a history of substance use disorders, including alcohol or other illicit drugs, that I may be at increased risk of addiction to my medications.  I agree to notify my provider immediately if I should become pregnant so that my treatment plan can be adjusted.  
 I agree and understand that I shall only receive controlled substance medications from the prescriber that signed this agreement unless there is written agreement among other prescribers of controlled substances outlining the responsibility of the medications being prescribed.  I understand that the if the controlled medication is not helping to achieve goals, the dosage may be tapered and no longer prescribed. 3. MY RESPONSIBILITY FOR COMMUNICATION / PRESCRIPTION RENEWALS 
 I agree that all controlled substance medications that I take will be prescribed only by my provider. If another healthcare provider prescribes me medication in an emergency, I will notify my provider within seventy-two (72) hours. Jose Muir (1960)             Page 2 of 4    Initials:_______  I will arrange for refills at the prescribed interval ONLY during regular office hours. I will not ask for refills earlier than agreed, after-hours, on holidays or weekends. Refills may take up to 72 hours for processing and prescriptions to reach the pharmacy.  I will inform my other health care providers that I am taking these medications and of the existence of this [id number]. In the event of an emergency, I will provide the same information to the emergency department prescribers.  I will keep my provider updated on the pharmacy I am using for controlled medication prescription filling. 4. MY RESPONSIBILITY FOR PROTECTING MEDICATIONS  I will protect my prescriptions and medications. I understand that lost or misplaced prescriptions will not be replaced.  I will keep medications only for my own use and will not share them with others. I will keep all medications away from children.  I agree that if my medications are adjusted or discontinued, I will properly dispose of any remaining medications. I understand that I will be required to dispose of any remaining controlled medications as, directed by my prescriber, prior to being provided with any prescriptions for other controlled medications.   Medication drop box locations can be found at: [employer] 5. MY RESPONSIBILITY WITH ILLEGAL DRUGS  I will not use illegal or street drugs or another person's prescription medications not prescribed to me.  If there are identified addiction type symptoms, then referral to a program may be provided by my provider and I agree to follow through with this recommendation. 6. MY RESPONSIBILITY FOR COOPERATION WITH INVESTIGATIONS  I understand that my provider will comply with any applicable law and may discuss my use and/or possible misuse/abuse of controlled substances and alcohol, as appropriate, with any health care provider involved in my care, pharmacist, or legal authority.  I authorize my provider and pharmacy to cooperate fully with law enforcement agencies (as permitted by law) in the investigation of any possible misuse, sale, or other diversion of my controlled substances.  I agree to waive any applicable privilege or right of privacy or confidentiality with respect to these authorizations. 7. PROVIDERS RIGHT TO MONITOR FOR SAFETY: PRESCRIPTION MONITORING / DRUG TESTING 
 I consent to drug/toxicology screening and will submit to a drug screen upon my providers request to assure I am only taking the prescribed drugs for my safety monitoring. I understand that a drug screen is a laboratory test in which a sample of my urine, blood or saliva is checked to see what drugs I have been taking. This may entail an observed urine specimen, which means that a nurse or other health care provider may watch me provide urine, and I will cooperate if I am asked to provide an observed specimen. Deejay Dong (1960)             Page 3 of 4    Initials:_______  I understand that my provider will check a copy of my State Prescription Monitoring Program () Report in order to safely prescribe medications.    
 Pill Counts: I consent to pill counts when requested. I may be asked to bring all my prescribed controlled substance medications, in their original bottles, to all of my scheduled appointments. In addition, my provider may ask me to come to the practice at any time for a random pill count. 8. TERMINATION OF THIS AGREEMENT  For my safety, my prescriber has the right to stop prescribing controlled substance medications and may end this agreement.  Conditions that may result in termination of this agreement: 
a. I do not show any improvement in pain, or my activity has not improved. b. I develop rapid tolerance or loss of improvement, as described in my treatment plan. 
c. I develop significant side effects from the medication. d. My behavior is not consistent with the responsibilities outlined above, thereby causing safety concerns to continue prescribing controlled substance medications. e. I fail to follow the terms of this agreement. f. Other:____________________________ UNDERSTANDING THIS MEDICATION AGREEMENT:   
I have read the above and have had all my questions answered. For chronic disease management, I know that my symptoms can be managed with many types of treatments. A chronic medication trial may be part of my treatment, but I must be an active participant in my care. Medication therapy is only one part of my symptom management plan. In some cases, there may be limited scientific evidence to support the chronic use of certain medications to improve symptoms and daily function. Furthermore, in certain circumstances, there may be scientific information that suggests that the use of chronic controlled substances may worsen my symptoms and increase my risk of unintentional death directly related to this medication therapy. I know that if my provider feels my risk from controlled medications is greater than my benefit, I will have my controlled substance medication(s) compassionately lowered or removed altogether. I further agree to allow this office to contact my HIPAA contact if there are concerns about my safety and use of the controlled medications. I have agreed to use the prescribed controlled substance medications to me as instructed by my provider and as stated in this Medication Agreement. My initial on each page and my signature below shows that I have read each page and I have had the opportunity to ask questions with answers provided by my provider. Patient Name (Printed): _____________________________________ Patient Signature:  ______________________   Date: _____________ Prescriber Name (Printed): ___________________________________ Prescriber Signature: _____________________  Date: _____________ Js Vasquez (1960)             Page 4 of 4

## 2021-07-28 RX ORDER — AMLODIPINE BESYLATE 2.5 MG/1
TABLET ORAL
Qty: 90 TABLET | Refills: 3 | Status: SHIPPED | OUTPATIENT
Start: 2021-07-28 | End: 2022-01-10 | Stop reason: DRUGHIGH

## 2022-01-10 ENCOUNTER — OFFICE VISIT (OUTPATIENT)
Dept: FAMILY MEDICINE CLINIC | Age: 62
End: 2022-01-10
Payer: COMMERCIAL

## 2022-01-10 VITALS
HEIGHT: 66 IN | DIASTOLIC BLOOD PRESSURE: 89 MMHG | BODY MASS INDEX: 31.21 KG/M2 | SYSTOLIC BLOOD PRESSURE: 138 MMHG | HEART RATE: 85 BPM | OXYGEN SATURATION: 100 % | RESPIRATION RATE: 16 BRPM | WEIGHT: 194.2 LBS | TEMPERATURE: 97.1 F

## 2022-01-10 DIAGNOSIS — R73.02 IGT (IMPAIRED GLUCOSE TOLERANCE): ICD-10-CM

## 2022-01-10 DIAGNOSIS — I10 ESSENTIAL HYPERTENSION: ICD-10-CM

## 2022-01-10 DIAGNOSIS — Z00.00 ROUTINE GENERAL MEDICAL EXAMINATION AT A HEALTH CARE FACILITY: Primary | ICD-10-CM

## 2022-01-10 DIAGNOSIS — E55.9 HYPOVITAMINOSIS D: ICD-10-CM

## 2022-01-10 DIAGNOSIS — F43.21 ADJUSTMENT DISORDER WITH DEPRESSED MOOD: ICD-10-CM

## 2022-01-10 DIAGNOSIS — Z23 NEEDS FLU SHOT: ICD-10-CM

## 2022-01-10 DIAGNOSIS — Z85.3 HX: BREAST CANCER: ICD-10-CM

## 2022-01-10 DIAGNOSIS — Z90.13 S/P BILATERAL MASTECTOMY: ICD-10-CM

## 2022-01-10 DIAGNOSIS — E66.9 NON MORBID OBESITY: ICD-10-CM

## 2022-01-10 DIAGNOSIS — Z51.81 ENCOUNTER FOR MEDICATION MONITORING: ICD-10-CM

## 2022-01-10 PROCEDURE — 99396 PREV VISIT EST AGE 40-64: CPT | Performed by: FAMILY MEDICINE

## 2022-01-10 PROCEDURE — 90471 IMMUNIZATION ADMIN: CPT | Performed by: FAMILY MEDICINE

## 2022-01-10 PROCEDURE — 90686 IIV4 VACC NO PRSV 0.5 ML IM: CPT | Performed by: FAMILY MEDICINE

## 2022-01-10 RX ORDER — AMLODIPINE BESYLATE 5 MG/1
5 TABLET ORAL DAILY
Qty: 90 TABLET | Refills: 1 | Status: SHIPPED | OUTPATIENT
Start: 2022-01-10 | End: 2022-07-11

## 2022-01-10 RX ORDER — AMLODIPINE BESYLATE 2.5 MG/1
5 TABLET ORAL DAILY
Qty: 90 TABLET | Refills: 3
Start: 2022-01-10 | End: 2022-01-10 | Stop reason: DRUGHIGH

## 2022-01-10 RX ORDER — ESCITALOPRAM OXALATE 10 MG/1
10 TABLET ORAL DAILY
Qty: 30 TABLET | Refills: 3 | Status: SHIPPED | OUTPATIENT
Start: 2022-01-10 | End: 2022-05-01

## 2022-01-10 NOTE — PROGRESS NOTES
Subjective:   62 y.o. female for Well Woman Check. Her gyne and breast care is done elsewhere by her Ob-Gyne physician. Hypertension Review:  Compliant w/ low salt diet, and some exercise. Tolerating Norvasc without problems. No home bp monitoring. No swelling, headache or dizziness. No chest pain, SOB, palpitations. Sleeping better with using the xanax. Glucose intolerance reveiw:  She has IGT. Last a1c level was at 5.5 %  Diabetic ROS - further diabetic ROS: no polyuria or polydipsia, no chest pain, dyspnea or TIA's, no numbness, tingling or pain in extremities, no unusual visual symptoms. Lab review: orders written for new lab studies as appropriate; see orders. S/p b/l mastectomy for hx of breast cancer October 2015. Colonoscopy 6/26/2020 by Dr. Hien Daniels repeat in 10 years. Patient Active Problem List   Diagnosis Code    [facility] C[facility]    Breast cancer genetic susceptibility Z15.01    Hypovitaminosis D E55.9    Osteoporosis M81.0    Hypertension I10    Insomnia G47.00    HX: breast cancer Z85.3    Encounter for medication monitoring Z51.81    IGT (impaired glucose tolerance) R73.02    S/P bilateral mastectomy Z90.13     Current Outpatient Medications   Medication Sig Dispense Refill    acetaminophen/diphenhydramine (TYLENOL PM PO) Take 1 Tablet by mouth nightly as needed.  amLODIPine (NORVASC) 5 mg tablet Take 1 Tablet by mouth daily. 90 Tablet 1    amLODIPine (NORVASC) 2.5 mg tablet Take 2 Tablets by mouth daily. TAKE 1 TABLET BY MOUTH EVERY DAY 90 Tablet 3    escitalopram oxalate (LEXAPRO) 10 mg tablet Take 1 Tablet by mouth daily. 30 Tablet 3    guaifenesin/phenylephrine HCl (MUCINEX COLD PO) Take 1 tab by mouth daily as needed      vitamin E (AQUA GEMS) 400 unit capsule Take 400 Units by mouth daily.  CALCIUM CARBONATE/VITAMIN D3 (CALCIUM CHEWABLE PLUS PO) Take 1 Tab by mouth daily.        Allergies   Allergen Reactions    Compazine [Prochlorperazine] Itching    Fentanyl Nausea Only    Neulasta [Pegfilgrastim] Other (comments)     Severe pain    Percocet [Oxycodone-Acetaminophen] Other (comments)     Hallucinations    Reglan [Metoclopramide] Other (comments)     Muscle tension    Sulfa (Sulfonamide Antibiotics) Nausea Only    Zofran [Ondansetron Hcl (Pf)] Itching     Past Medical History:   Diagnosis Date    Breast cancer genetic susceptibility     Cancer ([facility] [place] [facility] 1991    right breast    Cancer ([facility] [place] [facility] 1994    reoccurence in the lymph nodes.  Cancer [facility]) 2008    left breast    Chemotherapy     2008 and 1994    Hypertension      Past Surgical History:   Procedure Laterality Date    ENDOSCOPY, COLON, DIAGNOSTIC  4/10    manetas. repeat in5 yrs.  HC STEM CELL TRNSPL AUTOLOGOUS  1994    HX BREAST LUMPECTOMY  1994    right    HX BREAST LUMPECTOMY  2008    left breast    HX BREAST RECONSTRUCTION  10/5/2015    HX BREAST RECONSTRUCTION  12/07/2016    completed    HX MASTECTOMY Bilateral 10/05/2015    HX OOPHORECTOMY  1/28/2013     Family History   Problem Relation Age of Onset    Heart Disease Mother     Hypertension Mother     No Known Problems Father     Diabetes Maternal Grandmother     No Known Problems Maternal Grandfather     No Known Problems Paternal Grandmother     No Known Problems Paternal Grandfather      Social History     Tobacco Use    Smoking status: Never Smoker    Smokeless tobacco: Never Used   Substance Use Topics    Alcohol use: No     Alcohol/week: 0.0 standard drinks        ROS: Feeling generally well. No TIA's or unusual headaches, no dysphagia. No prolonged cough. No dyspnea or chest pain on exertion. No abdominal pain, change in bowel habits, black or bloody stools. No urinary tract symptoms. No new or unusual musculoskeletal symptoms. Specific concerns today: c/o feeling more depressed and stress over the past several weeks. Increased issues with \"handling everyone's else problems\".   Thinks that she has taken on too much. Did not elaborate. Wakes up frequently during the night. No SI/HI. Did not thinks that she needed counseling for now. Has been on an antidepressant in the past and thinks that it would be helpful to restart back on medication. Objective: The patient appears well, alert, oriented x 3, in no distress. Visit Vitals  /80 (BP 1 Location: Left arm, BP Patient Position: Sitting)   Pulse 85   Temp 97.1 °F (36.2 °C) (Oral)   Resp 16   Ht 5' 6\" (1.676 m)   Wt 194 lb 3.2 oz (88.1 kg)   LMP 01/01/1994   SpO2 100%   BMI 31.34 kg/m²     ENT normal.  Neck supple. No adenopathy or thyromegaly. JUDITH. Lungs are clear, good air entry, no wheezes, rhonchi or rales. S1 and S2 normal, no murmurs, regular rate and rhythm. Abdomen soft without tenderness, guarding, mass or organomegaly. Extremities show no edema, normal peripheral pulses. Neurological is normal, no focal findings. Breast and Pelvic exams are deferred. Assessment/Plan:   Well Woman  lose weight, increase physical activity, follow low fat diet, follow low salt diet, routine labs ordered, call if any problems  Diagnoses and all orders for this visit:    1. Routine general medical examination at a health care facility    2. Essential hypertension  Discussed sodium restriction, high k rich diet, maintaining ideal body weight and regular exercise program such as daily walking 30 min perday 4-5 times per week, as physiologic means to achieve blood pressure control. Medication compliance advised. -     LIPID PANEL; Future  -     Increase to amLODIPine (NORVASC) 5 mg tablet; Take 1 Tablet by mouth daily. 3. IGT (impaired glucose tolerance)  -     HEMOGLOBIN A1C WITH EAG; Future    4. Adjustment disorder with depressed mood  -     Start escitalopram oxalate (LEXAPRO) 10 mg tablet; Take 1 Tablet by mouth daily. 5. HX: breast cancer//  6. S/P bilateral mastectomy    7. Hypovitaminosis D  -     VITAMIN D, 25 HYDROXY; Future    8. Encounter for medication monitoring  -     METABOLIC PANEL, COMPREHENSIVE; Future  -     CBC W/O DIFF; Future  -     URINALYSIS W/ REFLEX CULTURE; Future    9. Non morbid obesity  I have reviewed/discussed the above normal BMI with the patient. I have recommended the following interventions: dietary management education, guidance, and counseling . 10. Needs flu shot  -     INFLUENZA VIRUS VAC QUAD,SPLIT,PRESV FREE SYRINGE IM      Follow-up and Dispositions    · Return in about 2 months (around 3/10/2022).        reviewed diet, exercise and weight control  cardiovascular risk and specific lipid/LDL goals reviewed  reviewed medications and side effects in detail  specific diabetic recommendations: low cholesterol diet, weight control and daily exercise discussed and glycohemoglobin and other lab monitoring discussed

## 2022-01-10 NOTE — PROGRESS NOTES
Chief Complaint   Patient presents with    Complete Physical         Mammogram 4/1/2015. Patient has had bilateral mastectomy. Colonoscopy 6/26/2020 by Dr. Kendrick Fuentes repeat in 10 years. Verbal order received per Dr. Oakley- flu vaccine IM- VORB    Pt received flu vaccine IM in right deltoid without any difficulty. 1. Have you been to the ER, urgent care clinic since your last visit? Hospitalized since your last visit? No    2. Have you seen or consulted any other health care providers outside of the [address] [place] [address] since your last visit? Include any pap smears or colon screening.   No

## 2022-01-14 LAB
25(OH)D3+25(OH)D2 SERPL-MCNC: 30 NG/ML (ref 30–100)
ALBUMIN SERPL-MCNC: 4.5 G/DL (ref 3.8–4.9)
ALBUMIN/GLOB SERPL: 1.8 {RATIO} (ref 1.2–2.2)
ALP SERPL-CCNC: 66 IU/L (ref 44–121)
ALT SERPL-CCNC: 12 IU/L (ref 0–32)
APPEARANCE UR: CLEAR
AST SERPL-CCNC: 15 IU/L (ref 0–40)
BACTERIA #/AREA URNS HPF: ABNORMAL /[HPF]
BILIRUB SERPL-MCNC: 0.4 MG/DL (ref 0–1.2)
BILIRUB UR QL STRIP: NEGATIVE
BUN SERPL-MCNC: 12 MG/DL (ref 8–27)
BUN/CREAT SERPL: 16 (ref 12–28)
CALCIUM SERPL-MCNC: 9.3 MG/DL (ref 8.7–10.3)
CASTS URNS QL MICRO: ABNORMAL /LPF
CHLORIDE SERPL-SCNC: 105 MMOL/L (ref 96–106)
CHOLEST SERPL-MCNC: 235 MG/DL (ref 100–199)
CO2 SERPL-SCNC: 24 MMOL/L (ref 20–29)
COLOR UR: YELLOW
CREAT SERPL-MCNC: 0.73 MG/DL (ref 0.57–1)
EPI CELLS #/AREA URNS HPF: ABNORMAL /HPF (ref 0–10)
ERYTHROCYTE [DISTWIDTH] IN BLOOD BY AUTOMATED COUNT: 13.1 % (ref 11.7–15.4)
EST. AVERAGE GLUCOSE BLD GHB EST-MCNC: 123 MG/DL
GLOBULIN SER CALC-MCNC: 2.5 G/DL (ref 1.5–4.5)
GLUCOSE SERPL-MCNC: 80 MG/DL (ref 65–99)
GLUCOSE UR QL: NEGATIVE
HBA1C MFR BLD: 5.9 % (ref 4.8–5.6)
HCT VFR BLD AUTO: 39.4 % (ref 34–46.6)
HDLC SERPL-MCNC: 93 MG/DL
HGB BLD-MCNC: 13.1 G/DL (ref 11.1–15.9)
HGB UR QL STRIP: NEGATIVE
IMP & REVIEW OF LAB RESULTS: NORMAL
KETONES UR QL STRIP: ABNORMAL
LDLC SERPL CALC-MCNC: 132 MG/DL (ref 0–99)
LEUKOCYTE ESTERASE UR QL STRIP: NEGATIVE
MCH RBC QN AUTO: 30.5 PG (ref 26.6–33)
MCHC RBC AUTO-ENTMCNC: 33.2 G/DL (ref 31.5–35.7)
MCV RBC AUTO: 92 FL (ref 79–97)
MICRO URNS: ABNORMAL
MICRO URNS: ABNORMAL
NITRITE UR QL STRIP: NEGATIVE
PH UR STRIP: 5.5 [PH] (ref 5–7.5)
PLATELET # BLD AUTO: 265 X10E3/UL (ref 150–450)
POTASSIUM SERPL-SCNC: 4.5 MMOL/L (ref 3.5–5.2)
PROT SERPL-MCNC: 7 G/DL (ref 6–8.5)
PROT UR QL STRIP: ABNORMAL
RBC # BLD AUTO: 4.29 X10E6/UL (ref 3.77–5.28)
RBC #/AREA URNS HPF: ABNORMAL /HPF (ref 0–2)
SODIUM SERPL-SCNC: 143 MMOL/L (ref 134–144)
SP GR UR: >=1.03 (ref 1–1.03)
TRIGL SERPL-MCNC: 58 MG/DL (ref 0–149)
URINALYSIS REFLEX, 377202: ABNORMAL
UROBILINOGEN UR STRIP-MCNC: 0.2 MG/DL (ref 0.2–1)
VLDLC SERPL CALC-MCNC: 10 MG/DL (ref 5–40)
WBC # BLD AUTO: 4.1 X10E3/UL (ref 3.4–10.8)
WBC #/AREA URNS HPF: ABNORMAL /HPF (ref 0–5)

## 2022-03-16 ENCOUNTER — OFFICE VISIT (OUTPATIENT)
Dept: FAMILY MEDICINE CLINIC | Age: 62
End: 2022-03-16
Payer: COMMERCIAL

## 2022-03-16 VITALS
HEIGHT: 66 IN | DIASTOLIC BLOOD PRESSURE: 82 MMHG | TEMPERATURE: 96.8 F | WEIGHT: 193.2 LBS | BODY MASS INDEX: 31.05 KG/M2 | OXYGEN SATURATION: 100 % | RESPIRATION RATE: 16 BRPM | SYSTOLIC BLOOD PRESSURE: 132 MMHG | HEART RATE: 66 BPM

## 2022-03-16 DIAGNOSIS — I10 ESSENTIAL HYPERTENSION: Primary | ICD-10-CM

## 2022-03-16 DIAGNOSIS — E66.9 NON MORBID OBESITY: ICD-10-CM

## 2022-03-16 DIAGNOSIS — R73.02 IGT (IMPAIRED GLUCOSE TOLERANCE): ICD-10-CM

## 2022-03-16 DIAGNOSIS — Z23 ENCOUNTER FOR IMMUNIZATION: ICD-10-CM

## 2022-03-16 DIAGNOSIS — F43.21 ADJUSTMENT DISORDER WITH DEPRESSED MOOD: ICD-10-CM

## 2022-03-16 DIAGNOSIS — Z51.81 ENCOUNTER FOR MEDICATION MONITORING: ICD-10-CM

## 2022-03-16 PROCEDURE — 90471 IMMUNIZATION ADMIN: CPT | Performed by: FAMILY MEDICINE

## 2022-03-16 PROCEDURE — 90750 HZV VACC RECOMBINANT IM: CPT | Performed by: FAMILY MEDICINE

## 2022-03-16 PROCEDURE — 99213 OFFICE O/P EST LOW 20 MIN: CPT | Performed by: FAMILY MEDICINE

## 2022-03-16 NOTE — PROGRESS NOTES
Chief Complaint   Patient presents with    Hypertension     follow up         Mammogram 4/1/2015. Patient has had bilateral mastectomy. Colonoscopy 6/26/2020 by Dr. Ramesh Medeiros repeat in 10 years. Verbal order received per Dr. Ajit Cope- Shingrix IM-VORB. Pt received Shingrix 0.5ml IM in right deltoid without any difficulty. 1. Have you been to the ER, urgent care clinic since your last visit? Hospitalized since your last visit? No    2. Have you seen or consulted any other health care providers outside of the [address] [place] [address] since your last visit? Include any pap smears or colon screening.   No

## 2022-03-16 NOTE — PATIENT INSTRUCTIONS
Vaccine Information Statement    Recombinant Zoster (Shingles) Vaccine: What You Need to Know    Many Vaccine Information Statements are available in Croatian and other languages. See www.immunize.org/vis  Hojas de información sobre vacunas están disponibles en español y en muchos otros idiomas. Visite www.immunize.org/vis    1. Why get vaccinated? Recombinant zoster (shingles) vaccine can prevent shingles. Shingles (also called herpes zoster, or just zoster) is a painful skin rash, usually with blisters. In addition to the rash, shingles can cause fever, headache, chills, or upset stomach. More rarely, shingles can lead to pneumonia, hearing problems, blindness, brain inflammation (encephalitis), or death. The most common complication of shingles is long-term nerve pain called postherpetic neuralgia (PHN). PHN occurs in the areas where the shingles rash was, even after the rash clears up. It can last for months or years after the rash goes away. The pain from PHN can be severe and debilitating. About 10 to 18% of people who get shingles will experience PHN. The risk of PHN increases with age. An older adult with shingles is more likely to develop PHN and have longer lasting and more severe pain than a younger person with shingles. Shingles is caused by the varicella zoster virus, the same virus that causes chickenpox. After you have chickenpox, the virus stays in your body and can cause shingles later in life. Shingles cannot be passed from one person to another, but the virus that causes shingles can spread and cause chickenpox in someone who had never had chickenpox or received chickenpox vaccine. 2. Recombinant shingles vaccine    Recombinant shingles vaccine provides strong protection against shingles. By preventing shingles, recombinant shingles vaccine also protects against PHN. Recombinant shingles vaccine is the preferred vaccine for the prevention of shingles.   However, a different vaccine, live shingles vaccine, may be used in some circumstances. The recombinant shingles vaccine is recommended for adults 50 years and older without serious immune problems. It is given as a two-dose series. This vaccine is also recommended for people who have already gotten another type of shingles vaccine, the live shingles vaccine. There is no live virus in this vaccine. Shingles vaccine may be given at the same time as other vaccines. 3. Talk with your health care provider    Tell your vaccine provider if the person getting the vaccine:   Has had an allergic reaction after a previous dose of recombinant shingles vaccine, or has any severe, life-threatening allergies.  Is pregnant or breastfeeding.  Is currently experiencing an episode of shingles. In some cases, your health care provider may decide to postpone shingles vaccination to a future visit. People with minor illnesses, such as a cold, may be vaccinated. People who are moderately or severely ill should usually wait until they recover before getting recombinant shingles vaccine. Your health care provider can give you more information. 4. Risks of a vaccine reaction     A sore arm with mild or moderate pain is very common after recombinant shingles vaccine, affecting about 80% of vaccinated people. Redness and swelling can also happen at the site of the injection.  Tiredness, muscle pain, headache, shivering, fever, stomach pain, and nausea happen after vaccination in more than half of people who receive recombinant shingles vaccine. In clinical trials, about 1 out of 6 people who got recombinant zoster vaccine experienced side effects that prevented them from doing regular activities. Symptoms usually went away on their own in 2 to 3 days. You should still get the second dose of recombinant zoster vaccine even if you had one of these reactions after the first dose.       People sometimes faint after medical procedures, including vaccination. Tell your provider if you feel dizzy or have vision changes or ringing in the ears. As with any medicine, there is a very remote chance of a vaccine causing a severe allergic reaction, other serious injury, or death. 5. What if there is a serious problem? An allergic reaction could occur after the vaccinated person leaves the clinic. If you see signs of a severe allergic reaction (hives, swelling of the face and throat, difficulty breathing, a fast heartbeat, dizziness, or weakness), call 9-1-1 and get the person to the nearest hospital.    For other signs that concern you, call your health care provider. Adverse reactions should be reported to the Vaccine Adverse Event Reporting System (VAERS). Your health care provider will usually file this report, or you can do it yourself. Visit the VAERS website at www.vaers. [facility].gov or call 5-231.288.2408. VAERS is only for reporting reactions, and VAERS staff do not give medical advice. 6. How can I learn more?  Ask your health care provider.  Call your local or state health department.    Contact the Centers for Disease Control and Prevention (CDC):  - Call 8-441.213.2513 (1-800-CDC-INFO) or  - Visit CDCs website at www.cdc.gov/vaccines    Vaccine Information Statement   Recombinant Zoster Vaccine   10/30/2019    Department of Health and [employer] for Disease Control and Prevention    Office Use Only

## 2022-03-16 NOTE — PROGRESS NOTES
HISTORY OF PRESENT ILLNESS  Faraz Metcalf is a 62 y.o. female. HPI   Follow up on chronic medical problems. Doing the precautionary measures at home to reduce risks of exposure COVID19. Also wearing mask when she is going out. No known sick contacts or known exposure to [place]. Follow up on c/o feeling more depressed and stress over the past several weeks. Increased issues with \"handling everyone's else problems\". Overall she is feeling better. Better mood and feeling less stressed. Thinks that the medication has helped. She is learning how to not take on other person issues. Sleep is good. Does c/o decreaseed libido but she is ok with that for now. No SI/HI. Did not thinks that she needed counseling for now. Hypertension Review:  Compliant w/ low salt diet, and some exercise. Tolerating increase dose of Norvasc without problems. No home bp monitoring. No swelling, headache or dizziness. No chest pain, SOB, palpitations. Sleeping better with using the xanax. Glucose intolerance reveiw:  She has IGT. Last a1c level was at 5.9 % in January. Diabetic ROS - further diabetic ROS: no polyuria or polydipsia, no chest pain, dyspnea or TIA's, no numbness, tingling or pain in extremities, no unusual visual symptoms. Lab review: orders written for new lab studies as appropriate; see orders. HM:  S/p b/l mastectomy for hx of breast cancer October 2015. Colonoscopy 6/26/2020 by Dr. Marquis Jackson repeat in 10 years.      Patient Active Problem List   Diagnosis Code    [facility]    Breast cancer genetic susceptibility Z15.01    Hypovitaminosis D E55.9    Osteoporosis M81.0    Hypertension I10    Insomnia G47.00    HX: breast cancer Z85.3    Encounter for medication monitoring Z51.81    IGT (impaired glucose tolerance) R73.02    S/P bilateral mastectomy Z90.13       Current Outpatient Medications   Medication Sig Dispense Refill    acetaminophen/diphenhydramine (TYLENOL PM PO) Take 1 Tablet by mouth nightly as needed.  amLODIPine (NORVASC) 5 mg tablet Take 1 Tablet by mouth daily. 90 Tablet 1    escitalopram oxalate (LEXAPRO) 10 mg tablet Take 1 Tablet by mouth daily. 30 Tablet 3    guaifenesin/phenylephrine HCl (MUCINEX COLD PO) Take 1 tab by mouth daily as needed      vitamin E (AQUA GEMS) 400 unit capsule Take 400 Units by mouth daily.  CALCIUM CARBONATE/VITAMIN D3 (CALCIUM CHEWABLE PLUS PO) Take 1 Tab by mouth daily. Allergies   Allergen Reactions    Compazine [Prochlorperazine] Itching    Fentanyl Nausea Only    Neulasta [Pegfilgrastim] Other (comments)     Severe pain    Percocet [Oxycodone-Acetaminophen] Other (comments)     Hallucinations    Reglan [Metoclopramide] Other (comments)     Muscle tension    Sulfa (Sulfonamide Antibiotics) Nausea Only    Zofran [Ondansetron Hcl (Pf)] Itching       Past Medical History:   Diagnosis Date    Breast cancer genetic susceptibility     [facility] 1991    right breast    Cancer ([facility] 1994    reoccurence in the lymph nodes.  Cancer [facility]) 2008    left breast    Chemotherapy     2008 and 1994    Hypertension        Past Surgical History:   Procedure Laterality Date    ENDOSCOPY, COLON, DIAGNOSTIC  4/10    manetas. repeat in5 yrs.     HC STEM CELL TRNSPL AUTOLOGOUS  1994    HX BREAST LUMPECTOMY  1994    right    HX BREAST LUMPECTOMY  2008    left breast    HX BREAST RECONSTRUCTION  10/5/2015    HX BREAST RECONSTRUCTION  12/07/2016    completed    HX MASTECTOMY Bilateral 10/05/2015    HX OOPHORECTOMY  1/28/2013       Family History   Problem Relation Age of Onset    Heart Disease Mother     Hypertension Mother     No Known Problems Father     Diabetes Maternal Grandmother     No Known Problems Maternal Grandfather     No Known Problems Paternal Grandmother     No Known Problems Paternal Grandfather        Social History     Tobacco Use    Smoking status: Never Smoker    Smokeless tobacco: Never Used   Substance Use Topics    Alcohol use: No     Alcohol/week: 0.0 standard drinks        Lab Results   Component Value Date/Time    WBC 4.1 01/10/2022 09:42 AM    HGB 13.1 01/10/2022 09:42 AM    HCT 39.4 01/10/2022 09:42 AM    PLATELET 445 28/82/8163 09:42 AM    MCV 92 01/10/2022 09:42 AM     Lab Results   Component Value Date/Time    Cholesterol, total 235 (H) 01/10/2022 09:42 AM    HDL Cholesterol 93 01/10/2022 09:42 AM    LDL, calculated 132 (H) 01/10/2022 09:42 AM    LDL, calculated 118 (H) 07/31/2020 12:20 PM    Triglyceride 58 01/10/2022 09:42 AM        Lab Results   Component Value Date/Time    Sodium 143 01/10/2022 09:42 AM    Potassium 4.5 01/10/2022 09:42 AM    Chloride 105 01/10/2022 09:42 AM    CO2 24 01/10/2022 09:42 AM    Glucose 80 01/10/2022 09:42 AM    BUN 12 01/10/2022 09:42 AM    Creatinine 0.73 01/10/2022 09:42 AM    BUN/Creatinine ratio 16 01/10/2022 09:42 AM    GFR est  01/10/2022 09:42 AM    GFR est non-AA 90 01/10/2022 09:42 AM    Calcium 9.3 01/10/2022 09:42 AM    Bilirubin, total 0.4 01/10/2022 09:42 AM    ALT (SGPT) 12 01/10/2022 09:42 AM    Alk. phosphatase 66 01/10/2022 09:42 AM    Protein, total 7.0 01/10/2022 09:42 AM    Albumin 4.5 01/10/2022 09:42 AM    A-G Ratio 1.8 01/10/2022 09:42 AM      Lab Results   Component Value Date/Time    Hemoglobin A1c 5.9 (H) 01/10/2022 09:42 AM    Hemoglobin A1c (POC) 5.5 10/15/2019 10:44 AM         Review of Systems   Constitutional: Negative for malaise/fatigue. HENT: Negative for congestion. Eyes: Negative for blurred vision. Respiratory: Negative for cough and shortness of breath. Cardiovascular: Negative for chest pain, palpitations and leg swelling. Gastrointestinal: Negative for abdominal pain, constipation and heartburn. Genitourinary: Negative for dysuria, frequency and urgency. Musculoskeletal: Negative for back pain and joint pain. Neurological: Negative for dizziness, tingling and headaches. Endo/Heme/Allergies: Negative for environmental allergies. Psychiatric/Behavioral: Negative for depression. The patient does not have insomnia. Physical Exam  Vitals and nursing note reviewed. Constitutional:       Appearance: Normal appearance. She is well-developed. Comments: /82   Pulse 66   Temp 96.8 °F (36 °C) (Oral)   Resp 16   Ht 5' 6\" (1.676 m)   Wt 193 lb 3.2 oz (87.6 kg)   LMP 01/01/1994   SpO2 100%   BMI 31.18 kg/m²    Neck:      Thyroid: No thyromegaly. Cardiovascular:      Rate and Rhythm: Normal rate and regular rhythm. Heart sounds: Normal heart sounds. No gallop. Pulmonary:      Effort: Pulmonary effort is normal.      Breath sounds: Normal breath sounds. Musculoskeletal:      Right lower leg: No edema. Left lower leg: No edema. Neurological:      Mental Status: She is alert. Psychiatric:         Attention and Perception: Attention normal.         Mood and Affect: Mood normal. Mood is not depressed. Speech: Speech normal.         Behavior: Behavior normal.         ASSESSMENT and PLAN  Diagnoses and all orders for this visit:    1. Essential hypertension  Improved BP. Discussed sodium restriction, high k rich diet, maintaining ideal body weight and regular exercise program such as daily walking 30 min perday 4-5 times per week, as physiologic means to achieve blood pressure control. Medication compliance advised. 2. IGT (impaired glucose tolerance)  Continue to monitor. Work on diet and exercise. 3. Adjustment disorder with depressed mood  Improved with lexapro    4. Encounter for medication monitoring    5. Non morbid obesity  I have reviewed/discussed the above normal BMI with the patient. I have recommended the following interventions: dietary management education, guidance, and counseling . Follow-up and Dispositions    · Return in about 5 months (around 8/16/2022).        reviewed diet, exercise and weight control  cardiovascular risk and specific lipid/LDL goals reviewed  reviewed medications and side effects in detail    I have discussed diagnosis listed in this note with pt and/or family. I have discussed treatment plans and options and the risk/benefit analysis of those options, including safe use of medications and possible medication side effects. Through the use of shared decision making we have agreed to the above plan. The patient has received an after-visit summary and questions were answered concerning future plans and follow up. Advise pt of any urgent changes then to proceed to the ER.

## 2022-03-19 PROBLEM — Z90.13 S/P BILATERAL MASTECTOMY: Status: ACTIVE | Noted: 2017-12-11

## 2022-08-16 ENCOUNTER — OFFICE VISIT (OUTPATIENT)
Dept: FAMILY MEDICINE CLINIC | Age: 62
End: 2022-08-16
Payer: COMMERCIAL

## 2022-08-16 VITALS
HEIGHT: 66 IN | TEMPERATURE: 97.8 F | DIASTOLIC BLOOD PRESSURE: 76 MMHG | HEART RATE: 63 BPM | OXYGEN SATURATION: 98 % | RESPIRATION RATE: 16 BRPM | SYSTOLIC BLOOD PRESSURE: 139 MMHG | WEIGHT: 193.6 LBS | BODY MASS INDEX: 31.12 KG/M2

## 2022-08-16 DIAGNOSIS — E78.00 HYPERCHOLESTEROLEMIA: ICD-10-CM

## 2022-08-16 DIAGNOSIS — R73.02 IGT (IMPAIRED GLUCOSE TOLERANCE): ICD-10-CM

## 2022-08-16 DIAGNOSIS — Z51.81 ENCOUNTER FOR MEDICATION MONITORING: ICD-10-CM

## 2022-08-16 DIAGNOSIS — F43.21 ADJUSTMENT DISORDER WITH DEPRESSED MOOD: ICD-10-CM

## 2022-08-16 DIAGNOSIS — I10 ESSENTIAL HYPERTENSION: Primary | ICD-10-CM

## 2022-08-16 DIAGNOSIS — E66.9 NON MORBID OBESITY: ICD-10-CM

## 2022-08-16 LAB
GLUCOSE POC: 78 MG/DL
HBA1C MFR BLD HPLC: 5.8 %

## 2022-08-16 PROCEDURE — 83036 HEMOGLOBIN GLYCOSYLATED A1C: CPT | Performed by: FAMILY MEDICINE

## 2022-08-16 PROCEDURE — 82947 ASSAY GLUCOSE BLOOD QUANT: CPT | Performed by: FAMILY MEDICINE

## 2022-08-16 PROCEDURE — 99214 OFFICE O/P EST MOD 30 MIN: CPT | Performed by: FAMILY MEDICINE

## 2022-08-16 NOTE — PROGRESS NOTES
Chief Complaint   Patient presents with    Hypertension     5m fu        1. \"Have you been to the ER, urgent care clinic since your last visit? Hospitalized since your last visit? \" No    2. \"Have you seen or consulted any other health care providers outside of the [address] [place] [address] since your last visit? \" No     3. For patients aged 39-70: Has the patient had a colonoscopy / FIT/ Cologuard? Yes - Care Gap present. Most recent result on file      If the patient is female:    4. For patients aged 41-77: Has the patient had a mammogram within the past 2 years? Yes - Care Gap present. Most recent result on file      5. For patients aged 21-65: Has the patient had a pap smear? Yes - Care Gap present.  Rooming MA/LPN to request [facility] Due   Topic Date Due    Cervical cancer screen  Never done    Shingrix Vaccine Age 50> (2 of 2) 05/11/2022    COVID-19 Vaccine (4 - Booster for [place] series) 05/24/2022

## 2022-08-16 NOTE — PROGRESS NOTES
HISTORY OF PRESENT ILLNESS  Zohra Beasley is a 65 y.o. female. HPI   Follow up on chronic medical problems. Doing the precautionary measures at home to reduce risks of exposure COVID19. Also wearing mask when she is going out. No known sick contacts or known exposure to [place]. Hypertension Review:  Compliant w/ low salt diet, and some exercise. Tolerating increase dose of Norvasc without problems. No home bp monitoring. No swelling, headache or dizziness. No chest pain, SOB, palpitations. Sleeping better with using the xanax. Glucose intolerance reveiw:  She has IGT. Last a1c level was at 5.9 % in January. Diabetic ROS - further diabetic ROS: no polyuria or polydipsia, no chest pain, dyspnea or TIA's, no numbness, tingling or pain in extremities, no unusual visual symptoms. Lab review: orders written for new lab studies as appropriate; see orders. Depression Review: Follow up on c/o feeling more depressed and stress over the past several weeks. Overall she is feeling better. Better mood and feeling less stressed. Thinks that the medication has helped. She is learning how to not take on other person issues. Sleep is good. Does c/o decreaseed libido but she is ok with that for now. No SI/HI. Did not thinks that she needed counseling for now. She experiences the following side effects from the treatment: none. HM:  S/p b/l mastectomy for hx of breast cancer October 2015. Colonoscopy 6/26/2020 by Dr. Joanna Zamora repeat in 10 years.      Patient Active Problem List   Diagnosis Code    [facility]    Breast cancer genetic susceptibility Z15.01    Hypovitaminosis D E55.9    Osteoporosis M81.0    Hypertension I10    Insomnia G47.00    HX: breast cancer Z85.3    Encounter for medication monitoring Z51.81    IGT (impaired glucose tolerance) R73.02    S/P bilateral mastectomy Z90.13       Current Outpatient Medications   Medication Sig Dispense Refill    amLODIPine (NORVASC) 5 mg tablet TAKE 1 TABLET BY MOUTH EVERY DAY 90 Tablet 3    escitalopram oxalate (LEXAPRO) 10 mg tablet TAKE 1 TABLET BY MOUTH EVERY DAY 30 Tablet 5    acetaminophen/diphenhydramine (TYLENOL PM PO) Take 1 Tablet by mouth nightly as needed. guaifenesin/phenylephrine HCl (MUCINEX COLD PO) Take 1 tab by mouth daily as needed      vitamin E (AQUA GEMS) 400 unit capsule Take 400 Units by mouth daily. CALCIUM CARBONATE/VITAMIN D3 (CALCIUM CHEWABLE PLUS PO) Take 1 Tab by mouth daily. Allergies   Allergen Reactions    Compazine [Prochlorperazine] Itching    Fentanyl Nausea Only    Neulasta [Pegfilgrastim] Other (comments)     Severe pain    Percocet [Oxycodone-Acetaminophen] Other (comments)     Hallucinations    Reglan [Metoclopramide] Other (comments)     Muscle tension    Sulfa (Sulfonamide Antibiotics) Nausea Only    Zofran [Ondansetron Hcl (Pf)] Itching           Past Medical History:   Diagnosis Date    Breast cancer genetic susceptibility     Cancer ([facility] 1991    right breast    Cancer ([facility] 1994    reoccurence in the lymph nodes. Cancer [facility]) 2008    left breast    Chemotherapy     2008 and 1994    Hypertension            Past Surgical History:   Procedure Laterality Date    ENDOSCOPY, COLON, DIAGNOSTIC  4/10    manetas. repeat in5 yrs.     HC STEM CELL TRNSPL AUTOLOGOUS  1994    HX BREAST LUMPECTOMY  1994    right    HX BREAST LUMPECTOMY  2008    left breast    HX BREAST RECONSTRUCTION  10/5/2015    HX BREAST RECONSTRUCTION  12/07/2016    completed    HX MASTECTOMY Bilateral 10/05/2015    HX OOPHORECTOMY  1/28/2013           Family History   Problem Relation Age of Onset    Heart Disease Mother     Hypertension Mother     No Known Problems Father     Diabetes Maternal Grandmother     No Known Problems Maternal Grandfather     No Known Problems Paternal Grandmother     No Known Problems Paternal Grandfather        Social History     Tobacco Use    Smoking status: Never    Smokeless tobacco: Never Substance Use Topics    Alcohol use: No     Alcohol/week: 0.0 standard drinks        Lab Results   Component Value Date/Time    WBC 4.1 01/10/2022 09:42 AM    HGB 13.1 01/10/2022 09:42 AM    HCT 39.4 01/10/2022 09:42 AM    PLATELET 153 48/09/2042 09:42 AM    MCV 92 01/10/2022 09:42 AM     Lab Results   Component Value Date/Time    Cholesterol, total 235 (H) 01/10/2022 09:42 AM    HDL Cholesterol 93 01/10/2022 09:42 AM    LDL, calculated 132 (H) 01/10/2022 09:42 AM    LDL, calculated 118 (H) 07/31/2020 12:20 PM    Triglyceride 58 01/10/2022 09:42 AM        Lab Results   Component Value Date/Time    Sodium 143 01/10/2022 09:42 AM    Potassium 4.5 01/10/2022 09:42 AM    Chloride 105 01/10/2022 09:42 AM    CO2 24 01/10/2022 09:42 AM    Glucose 80 01/10/2022 09:42 AM    BUN 12 01/10/2022 09:42 AM    Creatinine 0.73 01/10/2022 09:42 AM    BUN/Creatinine ratio 16 01/10/2022 09:42 AM    GFR est  01/10/2022 09:42 AM    GFR est non-AA 90 01/10/2022 09:42 AM    Calcium 9.3 01/10/2022 09:42 AM    Bilirubin, total 0.4 01/10/2022 09:42 AM    ALT (SGPT) 12 01/10/2022 09:42 AM    Alk. phosphatase 66 01/10/2022 09:42 AM    Protein, total 7.0 01/10/2022 09:42 AM    Albumin 4.5 01/10/2022 09:42 AM    A-G Ratio 1.8 01/10/2022 09:42 AM      Lab Results   Component Value Date/Time    Hemoglobin A1c 5.9 (H) 01/10/2022 09:42 AM    Hemoglobin A1c (POC) 5.5 10/15/2019 10:44 AM         Review of Systems   Constitutional:  Negative for malaise/fatigue. HENT:  Negative for congestion. Eyes:  Negative for blurred vision. Respiratory:  Negative for cough and shortness of breath. Cardiovascular:  Negative for chest pain, palpitations and leg swelling. Gastrointestinal:  Negative for abdominal pain, constipation and heartburn. Genitourinary:  Negative for dysuria, frequency and urgency. Musculoskeletal:  Negative for back pain and joint pain. Neurological:  Negative for dizziness, tingling and headaches. Endo/Heme/Allergies:  Negative for environmental allergies. Psychiatric/Behavioral:  Negative for depression. The patient does not have insomnia. Physical Exam  Vitals and nursing note reviewed. Constitutional:       Appearance: Normal appearance. She is well-developed. Comments: /76   Pulse 63   Temp 97.8 °F (36.6 °C) (Oral)   Resp 16   Ht 5' 6\" (1.676 m)   Wt 193 lb 9.6 oz (87.8 kg)   LMP 01/01/1994   SpO2 98%   BMI 31.25 kg/m²    HENT:      Right Ear: Tympanic membrane and ear canal normal.      Left Ear: Tympanic membrane and ear canal normal.   Neck:      Thyroid: No thyromegaly. Cardiovascular:      Rate and Rhythm: Normal rate and regular rhythm. Heart sounds: Normal heart sounds. Pulmonary:      Effort: Pulmonary effort is normal.      Breath sounds: Normal breath sounds. Abdominal:      General: Bowel sounds are normal.      Palpations: Abdomen is soft. There is no mass. Tenderness: There is no abdominal tenderness. Musculoskeletal:         General: Normal range of motion. Cervical back: Normal range of motion and neck supple. Right lower leg: No edema. Left lower leg: No edema. Lymphadenopathy:      Cervical: No cervical adenopathy. Skin:     General: Skin is warm and dry. Neurological:      General: No focal deficit present. Mental Status: She is alert and oriented to person, place, and time. Psychiatric:         Mood and Affect: Mood normal.     ASSESSMENT and PLAN  Diagnoses and all orders for this visit:    1. Essential hypertension  Discussed sodium restriction, high k rich diet, maintaining ideal body weight and regular exercise program such as daily walking 30 min perday 4-5 times per week, as physiologic means to achieve blood pressure control. Medication compliance advised. 2. Hypercholesterolemia  -     LIPID PANEL; Future    3.  IGT (impaired glucose tolerance)  -     AMB POC HEMOGLOBIN A1C  -     AMB POC GLUCOSE, QUANTITATIVE, BLOOD    4. Adjustment disorder with depressed mood  Stable     5. Encounter for medication monitoring    6. Non morbid obesity  I have reviewed/discussed the above normal BMI with the patient. I have recommended the following interventions: dietary management education, guidance, and counseling . Follow-up and Dispositions    Return in about 5 months (around 1/16/2023) for physical.       reviewed diet, exercise and weight control  cardiovascular risk and specific lipid/LDL goals reviewed  reviewed medications and side effects in detail  specific diabetic recommendations: low cholesterol diet, weight control and daily exercise discussed and glycohemoglobin and other lab monitoring discussed    I have discussed diagnosis listed in this note with pt and/or family. I have discussed treatment plans and options and the risk/benefit analysis of those options, including safe use of medications and possible medication side effects. Through the use of shared decision making we have agreed to the above plan. The patient has received an after-visit summary and questions were answered concerning future plans and follow up. Advise pt of any urgent changes then to proceed to the ER.

## 2022-08-17 LAB
CHOLEST SERPL-MCNC: 219 MG/DL (ref 100–199)
HDLC SERPL-MCNC: 91 MG/DL
IMP & REVIEW OF LAB RESULTS: NORMAL
LDLC SERPL CALC-MCNC: 114 MG/DL (ref 0–99)
TRIGL SERPL-MCNC: 80 MG/DL (ref 0–149)
VLDLC SERPL CALC-MCNC: 14 MG/DL (ref 5–40)

## 2023-01-16 ENCOUNTER — OFFICE VISIT (OUTPATIENT)
Dept: FAMILY MEDICINE CLINIC | Age: 63
End: 2023-01-16
Payer: COMMERCIAL

## 2023-01-16 VITALS
BODY MASS INDEX: 30.63 KG/M2 | RESPIRATION RATE: 13 BRPM | TEMPERATURE: 98.3 F | HEART RATE: 65 BPM | OXYGEN SATURATION: 98 % | SYSTOLIC BLOOD PRESSURE: 143 MMHG | WEIGHT: 190.6 LBS | HEIGHT: 66 IN | DIASTOLIC BLOOD PRESSURE: 81 MMHG

## 2023-01-16 DIAGNOSIS — R73.02 IGT (IMPAIRED GLUCOSE TOLERANCE): ICD-10-CM

## 2023-01-16 DIAGNOSIS — Z90.13 S/P BILATERAL MASTECTOMY: ICD-10-CM

## 2023-01-16 DIAGNOSIS — E78.00 HYPERCHOLESTEROLEMIA: ICD-10-CM

## 2023-01-16 DIAGNOSIS — E66.9 NON MORBID OBESITY: ICD-10-CM

## 2023-01-16 DIAGNOSIS — Z51.81 ENCOUNTER FOR MEDICATION MONITORING: ICD-10-CM

## 2023-01-16 DIAGNOSIS — I10 ESSENTIAL HYPERTENSION: ICD-10-CM

## 2023-01-16 DIAGNOSIS — Z85.3 HISTORY OF BREAST CANCER: ICD-10-CM

## 2023-01-16 DIAGNOSIS — Z00.00 ROUTINE GENERAL MEDICAL EXAMINATION AT A HEALTH CARE FACILITY: Primary | ICD-10-CM

## 2023-01-16 DIAGNOSIS — Z23 NEEDS FLU SHOT: ICD-10-CM

## 2023-01-16 PROCEDURE — 3077F SYST BP >= 140 MM HG: CPT | Performed by: FAMILY MEDICINE

## 2023-01-16 PROCEDURE — 99396 PREV VISIT EST AGE 40-64: CPT | Performed by: FAMILY MEDICINE

## 2023-01-16 PROCEDURE — 90686 IIV4 VACC NO PRSV 0.5 ML IM: CPT | Performed by: FAMILY MEDICINE

## 2023-01-16 PROCEDURE — 90471 IMMUNIZATION ADMIN: CPT | Performed by: FAMILY MEDICINE

## 2023-01-16 PROCEDURE — 3079F DIAST BP 80-89 MM HG: CPT | Performed by: FAMILY MEDICINE

## 2023-01-16 RX ORDER — AMLODIPINE BESYLATE 5 MG/1
10 TABLET ORAL DAILY
Qty: 180 TABLET | Refills: 3 | Status: SHIPPED | OUTPATIENT
Start: 2023-01-16

## 2023-01-16 NOTE — PROGRESS NOTES
Subjective:   65 y.o. female for Well Woman Check. Her gyne and breast care is done elsewhere by her Ob-Gyne physician. Hypertension Review:  Compliant w/ low salt diet, and some exercise. Tolerating Norvasc without problems. No home bp monitoring. No swelling, headache or dizziness. No chest pain, SOB, palpitations. Glucose intolerance reveiw:  She has IGT. Last a1c level was at 5.5 %  Diabetic ROS - further diabetic ROS: no polyuria or polydipsia, no chest pain, dyspnea or TIA's, no numbness, tingling or pain in extremities, no unusual visual symptoms. Lab review: orders written for new lab studies as appropriate; see orders. Depression Review:  Patient is seen for followup of depression. Treatment includes Lexapro. Ongoing symptoms include sress with water damge to her house ealeir on last year which she has not been able to live in over the past several months. She has started with moving back into her home. She denies depressed mood and fatigue. She experiences the following side effects from the treatment: none. S/p b/l mastectomy for hx of breast cancer October 2015. Colonoscopy 6/26/2020 by Dr. Vonda Red repeat in 10 years. Patient Active Problem List   Diagnosis Code    Breast cancer genetic susceptibility Z15.01    Hypovitaminosis D E55.9    Osteoporosis M81.0    Hypertension I10    Insomnia G47.00    HX: breast cancer Z85.3    Encounter for medication monitoring Z51.81    IGT (impaired glucose tolerance) R73.02    S/P bilateral mastectomy Z90.13     Current Outpatient Medications   Medication Sig Dispense Refill    escitalopram oxalate (LEXAPRO) 10 mg tablet TAKE 1 TABLET BY MOUTH EVERY DAY 90 Tablet 3    amLODIPine (NORVASC) 5 mg tablet TAKE 1 TABLET BY MOUTH EVERY DAY 90 Tablet 3    acetaminophen/diphenhydramine (TYLENOL PM PO) Take 1 Tablet by mouth nightly as needed.       guaifenesin/phenylephrine HCl (MUCINEX COLD PO) Take 1 tab by mouth daily as needed vitamin E (AQUA GEMS) 400 unit capsule Take 400 Units by mouth daily. CALCIUM CARBONATE/VITAMIN D3 (CALCIUM CHEWABLE PLUS PO) Take 1 Tab by mouth daily. Allergies   Allergen Reactions    Compazine [Prochlorperazine] Itching    Fentanyl Nausea Only    Neulasta [Pegfilgrastim] Other (comments)     Severe pain    Percocet [Oxycodone-Acetaminophen] Other (comments)     Hallucinations    Reglan [Metoclopramide] Other (comments)     Muscle tension    Sulfa (Sulfonamide Antibiotics) Nausea Only    Zofran [Ondansetron Hcl (Pf)] Itching     Past Medical History:   Diagnosis Date    Breast cancer genetic susceptibility     Cancer ([facility] [address] 1991    right breast    Cancer ([facility] [address] 1994    reoccurence in the lymph nodes. Cancer [facility]) 2008    left breast    Chemotherapy     2008 and 1994    Hypertension      Past Surgical History:   Procedure Laterality Date    ENDOSCOPY, COLON, DIAGNOSTIC  4/10    manetas. repeat in5 yrs. HC STEM CELL TRNSPL AUTOLOGOUS  1994    HX BREAST LUMPECTOMY  1994    right    HX BREAST LUMPECTOMY  2008    left breast    HX BREAST RECONSTRUCTION  10/5/2015    HX BREAST RECONSTRUCTION  12/07/2016    completed    HX MASTECTOMY Bilateral 10/05/2015    HX OOPHORECTOMY  1/28/2013     Family History   Problem Relation Age of Onset    Heart Disease Mother     Hypertension Mother     No Known Problems Father     Diabetes Maternal Grandmother     No Known Problems Maternal Grandfather     No Known Problems Paternal Grandmother     No Known Problems Paternal Grandfather      Social History     Tobacco Use    Smoking status: Never    Smokeless tobacco: Never   Substance Use Topics    Alcohol use: No     Alcohol/week: 0.0 standard drinks        ROS: Feeling generally well. No TIA's or unusual headaches, no dysphagia. No prolonged cough. No dyspnea or chest pain on exertion. No abdominal pain, change in bowel habits, black or bloody stools. No urinary tract symptoms.   No new or unusual musculoskeletal symptoms. Objective: The patient appears well, alert, oriented x 3, in no distress. Visit Vitals  BP (!) 143/81   Pulse 65   Temp 98.3 °F (36.8 °C)   Resp 13   Ht 5' 6\" (1.676 m)   Wt 190 lb 9.6 oz (86.5 kg)   LMP 01/01/1994   SpO2 98%   BMI 30.76 kg/m²         ENT normal.  Neck supple. No adenopathy or thyromegaly. JUDITH. Lungs are clear, good air entry, no wheezes, rhonchi or rales. S1 and S2 normal, no murmurs, regular rate and rhythm. Abdomen soft without tenderness, guarding, mass or organomegaly. Extremities show no edema, normal peripheral pulses. Neurological is normal, no focal findings. Breast and Pelvic exams are deferred. Assessment/Plan:   Well Woman  lose weight, increase physical activity, follow low fat diet, follow low salt diet, routine labs ordered, call if any problems  Diagnoses and all orders for this visit:      ASSESSMENT and PLAN  Diagnoses and all orders for this visit:    1. Routine general medical examination at a health care facility    2. Essential hypertension  -     increase amLODIPine (NORVASC) 5 mg tablet; Take 2 Tablets by mouth daily. Discussed sodium restriction, high k rich diet, maintaining ideal body weight and regular exercise program such as daily walking 30 min perday 4-5 times per week, as physiologic means to achieve blood pressure control. Medication compliance advised. 3. Hypercholesterolemia  Continue to monitor. Work on diet and exercise.  -     LIPID PANEL; Future    4. IGT (impaired glucose tolerance)  Continue to monitor. Work on diet and exercise.  -     HEMOGLOBIN A1C WITH EAG; Future    5. History of breast cancer//  6. S/P bilateral mastectomy  Stable     7. Encounter for medication monitoring  -     CBC W/O DIFF; Future  -     METABOLIC PANEL, COMPREHENSIVE; Future  -     URINALYSIS W/MICROSCOPIC; Future    8. Non morbid obesity  I have reviewed/discussed the above normal BMI with the patient.   I have recommended the following interventions: dietary management education, guidance, and counseling . Follow-up and Dispositions    Return in about 2 months (around 3/16/2023). reviewed diet, exercise and weight control  cardiovascular risk and specific lipid/LDL goals reviewed  reviewed medications and side effects in detail  specific diabetic recommendations: low cholesterol diet, weight control and daily exercise discussed and glycohemoglobin and other lab monitoring discussed    I have discussed diagnosis listed in this note with pt and/or family. I have discussed treatment plans and options and the risk/benefit analysis of those options, including safe use of medications and possible medication side effects. Through the use of shared decision making we have agreed to the above plan. The patient has received an after-visit summary and questions were answered concerning future plans and follow up. Advise pt of any urgent changes then to proceed to the ER.

## 2023-01-16 NOTE — PROGRESS NOTES
Patient identified by 2 identifiers. Chief Complaint   Patient presents with    Physical     1. Have you been to the ER, urgent care clinic since your last visit? Hospitalized since your last visit? No    2. Have you seen or consulted any other health care providers outside of the [address] [place] [address] since your last visit? Include any pap smears or colon screening. No    Verbal Order with Readback given by Bennie Alexander MD for Influenza. Given in right Deltoid without difficulty.

## 2023-01-16 NOTE — PATIENT INSTRUCTIONS
Vaccine Information Statement    Influenza (Flu) Vaccine (Inactivated or Recombinant): What You Need to Know    Many vaccine information statements are available in English and other languages. See www.immunize.org/vis. Hojas de información sobre vacunas están disponibles en español y en muchos otros idiomas. Visite www.immunize.org/vis. 1. Why get vaccinated? Influenza vaccine can prevent influenza (flu). Flu is a contagious disease that spreads around the [facility] every year, usually between October and May. Anyone can get the flu, but it is more dangerous for some people. Infants and young children, people 72 years and older, pregnant people, and people with certain health conditions or a weakened immune system are at greatest risk of flu complications. Pneumonia, bronchitis, sinus infections, and ear infections are examples of flu-related complications. If you have a medical condition, such as heart disease, cancer, or diabetes, flu can make it worse. Flu can cause fever and chills, sore throat, muscle aches, fatigue, cough, headache, and runny or stuffy nose. Some people may have vomiting and diarrhea, though this is more common in children than adults. In an average year, thousands of people in the [facility] die from flu, and many more are hospitalized. Flu vaccine prevents millions of illnesses and flu-related visits to the doctor each year. 2. Influenza vaccines     CDC recommends everyone 6 months and older get vaccinated every flu season. Children 6 months through 6years of age may need 2 doses during a single flu season. Everyone else needs only 1 dose each flu season. It takes about 2 weeks for protection to develop after vaccination. There are many flu viruses, and they are always changing. Each year a new flu vaccine is made to protect against the influenza viruses believed to be likely to cause disease in the upcoming flu season.  Even when the vaccine doesnt exactly match these viruses, it may still provide some protection. Influenza vaccine does not cause flu. Influenza vaccine may be given at the same time as other vaccines. 3. Talk with your health care provider    Tell your vaccination provider if the person getting the vaccine:  Has had an allergic reaction after a previous dose of influenza vaccine, or has any severe, life-threatening allergies   Has ever had Guillain-Barré Syndrome (also called GBS)    In some cases, your health care provider may decide to postpone influenza vaccination until a future visit. Influenza vaccine can be administered at any time during pregnancy. People who are or will be pregnant during influenza season should receive inactivated influenza vaccine. People with minor illnesses, such as a cold, may be vaccinated. People who are moderately or severely ill should usually wait until they recover before getting influenza vaccine. Your health care provider can give you more information. 4. Risks of a vaccine reaction    Soreness, redness, and swelling where the shot is given, fever, muscle aches, and headache can happen after influenza vaccination. There may be a very small increased risk of Guillain-Barré Syndrome (GBS) after inactivated influenza vaccine (the flu shot). Michael Re children who get the flu shot along with pneumococcal vaccine (PCV13) and/or DTaP vaccine at the same time might be slightly more likely to have a seizure caused by fever. Tell your health care provider if a child who is getting flu vaccine has ever had a seizure. People sometimes faint after medical procedures, including vaccination. Tell your provider if you feel dizzy or have vision changes or ringing in the ears. As with any medicine, there is a very remote chance of a vaccine causing a severe allergic reaction, other serious injury, or death. 5. What if there is a serious problem?     An allergic reaction could occur after the vaccinated person leaves the clinic. If you see signs of a severe allergic reaction (hives, swelling of the face and throat, difficulty breathing, a fast heartbeat, dizziness, or weakness), call 9-1-1 and get the person to the nearest hospital.    For other signs that concern you, call your health care provider. Adverse reactions should be reported to the Vaccine Adverse Event Reporting System (VAERS). Your health care provider will usually file this report, or you can do it yourself. Visit the VAERS website at www.vaers. [facility].gov or call 3-303.892.1816. VAERS is only for reporting reactions, and VAERS staff members do not give medical advice. 6. The National Vaccine Injury Compensation Program    The [facility] Vaccine Injury Compensation Program (VICP) is a federal program that was created to compensate people who may have been injured by certain vaccines. Claims regarding alleged injury or death due to vaccination have a time limit for filing, which may be as short as two years. Visit the VICP website at www.[facility].gov/vaccinecompensation or call 7-759.698.1707 to learn about the program and about filing a claim. 7. How can I learn more? Ask your health care provider. Call your local or state health department. Visit the website of the Food and Drug Administration (FDA) for vaccine package inserts and additional information at www.fda.gov/vaccines-blood-biologics/vaccines. Contact the Centers for Disease Control and Prevention (CDC): Call 9-127.820.8981 (1-732-NLF-INFO) or  Visit CDCs influenza website at www.cdc.gov/flu. Vaccine Information Statement   Inactivated Influenza Vaccine   8/6/2021  [address] [place] 145[address]-12   Department of Health and Human Services  Centers for Disease Control and Prevention    Office Use Only

## 2023-01-17 LAB
ALBUMIN SERPL-MCNC: 4.5 G/DL (ref 3.8–4.8)
ALBUMIN/GLOB SERPL: 1.6 {RATIO} (ref 1.2–2.2)
ALP SERPL-CCNC: 65 IU/L (ref 44–121)
ALT SERPL-CCNC: 11 IU/L (ref 0–32)
APPEARANCE UR: CLEAR
AST SERPL-CCNC: 18 IU/L (ref 0–40)
BACTERIA #/AREA URNS HPF: NORMAL /[HPF]
BILIRUB SERPL-MCNC: 0.4 MG/DL (ref 0–1.2)
BILIRUB UR QL STRIP: NEGATIVE
BUN SERPL-MCNC: 12 MG/DL (ref 8–27)
BUN/CREAT SERPL: 15 (ref 12–28)
CALCIUM SERPL-MCNC: 10 MG/DL (ref 8.7–10.3)
CASTS URNS QL MICRO: NORMAL /LPF
CHLORIDE SERPL-SCNC: 102 MMOL/L (ref 96–106)
CHOLEST SERPL-MCNC: 204 MG/DL (ref 100–199)
CO2 SERPL-SCNC: 24 MMOL/L (ref 20–29)
COLOR UR: YELLOW
CREAT SERPL-MCNC: 0.8 MG/DL (ref 0.57–1)
EGFR: 84 ML/MIN/1.73
EPI CELLS #/AREA URNS HPF: NORMAL /HPF (ref 0–10)
ERYTHROCYTE [DISTWIDTH] IN BLOOD BY AUTOMATED COUNT: 13.2 % (ref 11.7–15.4)
EST. AVERAGE GLUCOSE BLD GHB EST-MCNC: 114 MG/DL
GLOBULIN SER CALC-MCNC: 2.8 G/DL (ref 1.5–4.5)
GLUCOSE SERPL-MCNC: 83 MG/DL (ref 70–99)
GLUCOSE UR QL STRIP: NEGATIVE
HBA1C MFR BLD: 5.6 % (ref 4.8–5.6)
HCT VFR BLD AUTO: 38.5 % (ref 34–46.6)
HDLC SERPL-MCNC: 87 MG/DL
HGB BLD-MCNC: 12.9 G/DL (ref 11.1–15.9)
HGB UR QL STRIP: NEGATIVE
IMP & REVIEW OF LAB RESULTS: NORMAL
KETONES UR QL STRIP: NEGATIVE
LDLC SERPL CALC-MCNC: 105 MG/DL (ref 0–99)
LEUKOCYTE ESTERASE UR QL STRIP: NEGATIVE
MCH RBC QN AUTO: 29.9 PG (ref 26.6–33)
MCHC RBC AUTO-ENTMCNC: 33.5 G/DL (ref 31.5–35.7)
MCV RBC AUTO: 89 FL (ref 79–97)
MICRO URNS: NORMAL
MICRO URNS: NORMAL
NITRITE UR QL STRIP: NEGATIVE
PH UR STRIP: 6.5 [PH] (ref 5–7.5)
PLATELET # BLD AUTO: 259 X10E3/UL (ref 150–450)
POTASSIUM SERPL-SCNC: 4.8 MMOL/L (ref 3.5–5.2)
PROT SERPL-MCNC: 7.3 G/DL (ref 6–8.5)
PROT UR QL STRIP: NEGATIVE
RBC # BLD AUTO: 4.31 X10E6/UL (ref 3.77–5.28)
RBC #/AREA URNS HPF: NORMAL /HPF (ref 0–2)
SODIUM SERPL-SCNC: 144 MMOL/L (ref 134–144)
SP GR UR STRIP: 1.02 (ref 1–1.03)
TRIGL SERPL-MCNC: 65 MG/DL (ref 0–149)
UROBILINOGEN UR STRIP-MCNC: 1 MG/DL (ref 0.2–1)
VLDLC SERPL CALC-MCNC: 12 MG/DL (ref 5–40)
WBC # BLD AUTO: 4.1 X10E3/UL (ref 3.4–10.8)
WBC #/AREA URNS HPF: NORMAL /HPF (ref 0–5)

## 2023-03-17 ENCOUNTER — OFFICE VISIT (OUTPATIENT)
Dept: FAMILY MEDICINE CLINIC | Age: 63
End: 2023-03-17

## 2023-03-17 VITALS
TEMPERATURE: 98.7 F | WEIGHT: 186.4 LBS | DIASTOLIC BLOOD PRESSURE: 80 MMHG | HEIGHT: 66 IN | SYSTOLIC BLOOD PRESSURE: 134 MMHG | OXYGEN SATURATION: 100 % | HEART RATE: 70 BPM | RESPIRATION RATE: 12 BRPM | BODY MASS INDEX: 29.96 KG/M2

## 2023-03-17 DIAGNOSIS — Z51.81 ENCOUNTER FOR MEDICATION MONITORING: ICD-10-CM

## 2023-03-17 DIAGNOSIS — I10 ESSENTIAL HYPERTENSION: Primary | ICD-10-CM

## 2023-03-17 DIAGNOSIS — F43.21 ADJUSTMENT DISORDER WITH DEPRESSED MOOD: ICD-10-CM

## 2023-03-17 RX ORDER — ESCITALOPRAM OXALATE 10 MG/1
10 TABLET ORAL DAILY
Qty: 90 TABLET | Refills: 3 | Status: SHIPPED | OUTPATIENT
Start: 2023-03-17

## 2023-03-17 RX ORDER — AMLODIPINE BESYLATE 10 MG/1
10 TABLET ORAL DAILY
Qty: 90 TABLET | Refills: 3 | Status: SHIPPED | OUTPATIENT
Start: 2023-03-17

## 2023-03-17 RX ORDER — AMLODIPINE BESYLATE 5 MG/1
10 TABLET ORAL DAILY
Qty: 180 TABLET | Refills: 3 | Status: SHIPPED | OUTPATIENT
Start: 2023-03-17 | End: 2023-03-17 | Stop reason: DRUGHIGH

## 2023-03-17 NOTE — PROGRESS NOTES
Patient identified by 2 identifiers. Chief Complaint   Patient presents with    Follow-up    Hypertension     1. Have you been to the ER, urgent care clinic since your last visit? Hospitalized since your last visit? No    2. Have you seen or consulted any other health care providers outside of the [address] [place] [address] since your last visit? Include any pap smears or colon screening.  No

## 2023-07-19 DIAGNOSIS — I10 ESSENTIAL (PRIMARY) HYPERTENSION: ICD-10-CM

## 2023-07-19 RX ORDER — AMLODIPINE BESYLATE 5 MG/1
TABLET ORAL
Qty: 90 TABLET | Refills: 3 | Status: SHIPPED | OUTPATIENT
Start: 2023-07-19

## 2023-07-19 NOTE — TELEPHONE ENCOUNTER
I show the last Norvasc dose sent was for 10mg/day on 3/17/23. Please advise if the patient should be taking 5mg/day or 10mg/day? For Pharmacy Admin Tracking Only    Program: Medication Refill  CPA in place:    Recommendation Provided To:    Intervention Detail: New Rx: 1, reason: Needs Additional Therapy  Intervention Accepted By:   Gap Closed?:    Time Spent (min): 5

## 2023-08-15 SDOH — ECONOMIC STABILITY: FOOD INSECURITY: WITHIN THE PAST 12 MONTHS, YOU WORRIED THAT YOUR FOOD WOULD RUN OUT BEFORE YOU GOT MONEY TO BUY MORE.: NEVER TRUE

## 2023-08-15 SDOH — ECONOMIC STABILITY: INCOME INSECURITY: HOW HARD IS IT FOR YOU TO PAY FOR THE VERY BASICS LIKE FOOD, HOUSING, MEDICAL CARE, AND HEATING?: NOT HARD AT ALL

## 2023-08-15 SDOH — ECONOMIC STABILITY: HOUSING INSECURITY
IN THE LAST 12 MONTHS, WAS THERE A TIME WHEN YOU DID NOT HAVE A STEADY PLACE TO SLEEP OR SLEPT IN A SHELTER (INCLUDING NOW)?: NO

## 2023-08-15 SDOH — ECONOMIC STABILITY: TRANSPORTATION INSECURITY
IN THE PAST 12 MONTHS, HAS LACK OF TRANSPORTATION KEPT YOU FROM MEETINGS, WORK, OR FROM GETTING THINGS NEEDED FOR DAILY LIVING?: NO

## 2023-08-15 SDOH — ECONOMIC STABILITY: FOOD INSECURITY: WITHIN THE PAST 12 MONTHS, THE FOOD YOU BOUGHT JUST DIDN'T LAST AND YOU DIDN'T HAVE MONEY TO GET MORE.: NEVER TRUE

## 2023-08-18 ENCOUNTER — CLINICAL DOCUMENTATION (OUTPATIENT)
Age: 63
End: 2023-08-18

## 2023-08-18 ENCOUNTER — OFFICE VISIT (OUTPATIENT)
Age: 63
End: 2023-08-18
Payer: COMMERCIAL

## 2023-08-18 VITALS
SYSTOLIC BLOOD PRESSURE: 139 MMHG | WEIGHT: 189 LBS | TEMPERATURE: 97.9 F | DIASTOLIC BLOOD PRESSURE: 83 MMHG | BODY MASS INDEX: 30.37 KG/M2 | HEIGHT: 66 IN | OXYGEN SATURATION: 100 % | HEART RATE: 65 BPM | RESPIRATION RATE: 18 BRPM

## 2023-08-18 DIAGNOSIS — M54.42 CHRONIC LEFT-SIDED LOW BACK PAIN WITH LEFT-SIDED SCIATICA: Primary | ICD-10-CM

## 2023-08-18 DIAGNOSIS — R45.86 MOOD SWINGS: ICD-10-CM

## 2023-08-18 DIAGNOSIS — E78.00 HYPERCHOLESTEROLEMIA: ICD-10-CM

## 2023-08-18 DIAGNOSIS — F43.21 ADJUSTMENT DISORDER WITH DEPRESSED MOOD: ICD-10-CM

## 2023-08-18 DIAGNOSIS — Z79.899 ENCOUNTER FOR LONG-TERM (CURRENT) USE OF MEDICATIONS: ICD-10-CM

## 2023-08-18 DIAGNOSIS — I10 ESSENTIAL (PRIMARY) HYPERTENSION: ICD-10-CM

## 2023-08-18 DIAGNOSIS — R73.02 IMPAIRED GLUCOSE TOLERANCE (ORAL): ICD-10-CM

## 2023-08-18 DIAGNOSIS — G89.29 CHRONIC LEFT-SIDED LOW BACK PAIN WITH LEFT-SIDED SCIATICA: Primary | ICD-10-CM

## 2023-08-18 PROCEDURE — 3079F DIAST BP 80-89 MM HG: CPT | Performed by: FAMILY MEDICINE

## 2023-08-18 PROCEDURE — 99214 OFFICE O/P EST MOD 30 MIN: CPT | Performed by: FAMILY MEDICINE

## 2023-08-18 PROCEDURE — 3075F SYST BP GE 130 - 139MM HG: CPT | Performed by: FAMILY MEDICINE

## 2023-08-18 RX ORDER — ESCITALOPRAM OXALATE 10 MG/1
20 TABLET ORAL DAILY
Qty: 60 TABLET | Refills: 5 | Status: SHIPPED | OUTPATIENT
Start: 2023-08-18

## 2023-08-18 RX ORDER — METHOCARBAMOL 500 MG/1
500 TABLET, FILM COATED ORAL NIGHTLY PRN
Qty: 30 TABLET | Refills: 0 | Status: SHIPPED | OUTPATIENT
Start: 2023-08-18

## 2023-08-18 RX ORDER — METHYLPREDNISOLONE 4 MG/1
TABLET ORAL
Qty: 1 KIT | Refills: 0 | Status: SHIPPED | OUTPATIENT
Start: 2023-08-18

## 2023-08-18 RX ORDER — MELOXICAM 15 MG/1
15 TABLET ORAL DAILY PRN
Qty: 30 TABLET | Refills: 1 | Status: SHIPPED | OUTPATIENT
Start: 2023-08-18

## 2023-08-18 SDOH — ECONOMIC STABILITY: FOOD INSECURITY: WITHIN THE PAST 12 MONTHS, THE FOOD YOU BOUGHT JUST DIDN'T LAST AND YOU DIDN'T HAVE MONEY TO GET MORE.: NEVER TRUE

## 2023-08-18 SDOH — ECONOMIC STABILITY: FOOD INSECURITY: WITHIN THE PAST 12 MONTHS, YOU WORRIED THAT YOUR FOOD WOULD RUN OUT BEFORE YOU GOT MONEY TO BUY MORE.: NEVER TRUE

## 2023-08-18 SDOH — ECONOMIC STABILITY: INCOME INSECURITY: HOW HARD IS IT FOR YOU TO PAY FOR THE VERY BASICS LIKE FOOD, HOUSING, MEDICAL CARE, AND HEATING?: NOT HARD AT ALL

## 2023-08-18 ASSESSMENT — ENCOUNTER SYMPTOMS
SHORTNESS OF BREATH: 0
ABDOMINAL PAIN: 0
COUGH: 0
BACK PAIN: 1
RHINORRHEA: 0
BLOOD IN STOOL: 0
CONSTIPATION: 0
CHEST TIGHTNESS: 0

## 2023-08-18 ASSESSMENT — PATIENT HEALTH QUESTIONNAIRE - PHQ9
SUM OF ALL RESPONSES TO PHQ QUESTIONS 1-9: 0
SUM OF ALL RESPONSES TO PHQ QUESTIONS 1-9: 0
SUM OF ALL RESPONSES TO PHQ9 QUESTIONS 1 & 2: 0
SUM OF ALL RESPONSES TO PHQ QUESTIONS 1-9: 0
1. LITTLE INTEREST OR PLEASURE IN DOING THINGS: 0
2. FEELING DOWN, DEPRESSED OR HOPELESS: 0
SUM OF ALL RESPONSES TO PHQ QUESTIONS 1-9: 0

## 2023-08-19 LAB — TSH SERPL DL<=0.005 MIU/L-ACNC: 1.64 UIU/ML (ref 0.45–4.5)

## 2023-10-18 DIAGNOSIS — M54.42 CHRONIC LEFT-SIDED LOW BACK PAIN WITH LEFT-SIDED SCIATICA: ICD-10-CM

## 2023-10-18 DIAGNOSIS — G89.29 CHRONIC LEFT-SIDED LOW BACK PAIN WITH LEFT-SIDED SCIATICA: ICD-10-CM

## 2023-10-23 RX ORDER — METHOCARBAMOL 500 MG/1
500 TABLET, FILM COATED ORAL NIGHTLY PRN
Qty: 30 TABLET | Refills: 0 | Status: SHIPPED | OUTPATIENT
Start: 2023-10-23

## 2023-10-23 NOTE — TELEPHONE ENCOUNTER
Last appointment: 8/18/23  Next appointment: 11/14/23  Previous refill encounter(s): 8/18/23 #30    Requested Prescriptions     Pending Prescriptions Disp Refills    methocarbamol (ROBAXIN) 500 MG tablet [Pharmacy Med Name: METHOCARBAMOL 500 MG TABLET] 30 tablet 0     Sig: TAKE 1 TABLET BY MOUTH NIGHTLY AS NEEDED (BACK PAIN AND SPASMS)         For Pharmacy Admin Tracking Only    Program: Medication Refill  CPA in place:    Recommendation Provided To:    Intervention Detail: New Rx: 1, reason: Patient Preference  Intervention Accepted By:   Maranda Suarez Closed?:    Time Spent (min): 5

## 2023-11-14 ENCOUNTER — OFFICE VISIT (OUTPATIENT)
Age: 63
End: 2023-11-14
Payer: COMMERCIAL

## 2023-11-14 VITALS
RESPIRATION RATE: 18 BRPM | SYSTOLIC BLOOD PRESSURE: 136 MMHG | DIASTOLIC BLOOD PRESSURE: 84 MMHG | OXYGEN SATURATION: 98 % | HEIGHT: 66 IN | BODY MASS INDEX: 31.82 KG/M2 | TEMPERATURE: 97.8 F | HEART RATE: 62 BPM | WEIGHT: 198 LBS

## 2023-11-14 DIAGNOSIS — I10 ESSENTIAL (PRIMARY) HYPERTENSION: Primary | ICD-10-CM

## 2023-11-14 DIAGNOSIS — F43.21 ADJUSTMENT DISORDER WITH DEPRESSED MOOD: ICD-10-CM

## 2023-11-14 DIAGNOSIS — M54.42 CHRONIC LEFT-SIDED LOW BACK PAIN WITH LEFT-SIDED SCIATICA: ICD-10-CM

## 2023-11-14 DIAGNOSIS — R73.02 IMPAIRED GLUCOSE TOLERANCE (ORAL): ICD-10-CM

## 2023-11-14 DIAGNOSIS — E78.00 HYPERCHOLESTEROLEMIA: ICD-10-CM

## 2023-11-14 DIAGNOSIS — G89.29 CHRONIC LEFT-SIDED LOW BACK PAIN WITH LEFT-SIDED SCIATICA: ICD-10-CM

## 2023-11-14 DIAGNOSIS — Z79.899 ENCOUNTER FOR LONG-TERM (CURRENT) USE OF MEDICATIONS: ICD-10-CM

## 2023-11-14 DIAGNOSIS — E66.9 NON MORBID OBESITY: ICD-10-CM

## 2023-11-14 LAB
GLUCOSE, POC: 76 MG/DL
HBA1C MFR BLD: 5.6 %

## 2023-11-14 PROCEDURE — 90471 IMMUNIZATION ADMIN: CPT | Performed by: FAMILY MEDICINE

## 2023-11-14 PROCEDURE — 90715 TDAP VACCINE 7 YRS/> IM: CPT | Performed by: FAMILY MEDICINE

## 2023-11-14 PROCEDURE — 82962 GLUCOSE BLOOD TEST: CPT | Performed by: FAMILY MEDICINE

## 2023-11-14 PROCEDURE — 83036 HEMOGLOBIN GLYCOSYLATED A1C: CPT | Performed by: FAMILY MEDICINE

## 2023-11-14 PROCEDURE — 3079F DIAST BP 80-89 MM HG: CPT | Performed by: FAMILY MEDICINE

## 2023-11-14 PROCEDURE — 3075F SYST BP GE 130 - 139MM HG: CPT | Performed by: FAMILY MEDICINE

## 2023-11-14 PROCEDURE — 90472 IMMUNIZATION ADMIN EACH ADD: CPT | Performed by: FAMILY MEDICINE

## 2023-11-14 PROCEDURE — 99214 OFFICE O/P EST MOD 30 MIN: CPT | Performed by: FAMILY MEDICINE

## 2023-11-14 PROCEDURE — 90674 CCIIV4 VAC NO PRSV 0.5 ML IM: CPT | Performed by: FAMILY MEDICINE

## 2023-11-14 SDOH — ECONOMIC STABILITY: INCOME INSECURITY: HOW HARD IS IT FOR YOU TO PAY FOR THE VERY BASICS LIKE FOOD, HOUSING, MEDICAL CARE, AND HEATING?: NOT HARD AT ALL

## 2023-11-14 SDOH — ECONOMIC STABILITY: FOOD INSECURITY: WITHIN THE PAST 12 MONTHS, THE FOOD YOU BOUGHT JUST DIDN'T LAST AND YOU DIDN'T HAVE MONEY TO GET MORE.: NEVER TRUE

## 2023-11-14 SDOH — ECONOMIC STABILITY: FOOD INSECURITY: WITHIN THE PAST 12 MONTHS, YOU WORRIED THAT YOUR FOOD WOULD RUN OUT BEFORE YOU GOT MONEY TO BUY MORE.: NEVER TRUE

## 2023-11-14 ASSESSMENT — ENCOUNTER SYMPTOMS
RHINORRHEA: 0
SHORTNESS OF BREATH: 0
CONSTIPATION: 0
BLOOD IN STOOL: 0
ABDOMINAL PAIN: 0
CHEST TIGHTNESS: 0
COUGH: 0

## 2023-11-14 ASSESSMENT — PATIENT HEALTH QUESTIONNAIRE - PHQ9
SUM OF ALL RESPONSES TO PHQ QUESTIONS 1-9: 0
SUM OF ALL RESPONSES TO PHQ QUESTIONS 1-9: 0
1. LITTLE INTEREST OR PLEASURE IN DOING THINGS: 0
SUM OF ALL RESPONSES TO PHQ9 QUESTIONS 1 & 2: 0
SUM OF ALL RESPONSES TO PHQ QUESTIONS 1-9: 0
2. FEELING DOWN, DEPRESSED OR HOPELESS: 0
SUM OF ALL RESPONSES TO PHQ QUESTIONS 1-9: 0

## 2023-11-14 NOTE — PROGRESS NOTES
Subjective:      Patient ID: Annette Nuñez is a 59 y.o. female. HPI  Follow up on chronic medical problems. Hypertension Review:  Compliant w/ low salt diet, and some exercise. Tolerating Norvasc without problems. No home bp monitoring. No swelling, headache or dizziness. No chest pain, SOB, palpitations. Glucose intolerance reveiw:  She has IGT. Last a1c level was at 5.5 %  Diabetic ROS - further diabetic ROS: no polyuria or polydipsia, no chest pain, dyspnea or TIA's, no numbness, tingling or pain in extremities, no unusual visual symptoms. Lab review: orders written for new lab studies as appropriate; see orders. Depression Review:  Patient is seen for followup of depression. Treatment includes Lexapro. Ongoing symptoms include sress with water damge to her house eacat on last year which she has not been able to live in over the past several months. She has started with moving back into her home. She denies depressed mood and fatigue. She experiences the following side effects from the treatment: none. Follow up on c/o left low back pain from her last visit. Overall the pain has improved with PT. Pain was radiating to the left buttock and down the left leg which has improved. There is no numbness or weakness in the legs. Had a reaction to the mobic with rash so she stopping taking it. Has only been taking occassional aleve for the pain which does help which it does bother her. S/p b/l mastectomy for hx of breast cancer October 2015. Colonoscopy 6/26/2020 by Dr. Glynn repeat in 10 years. Past Medical History:   Diagnosis Date    Breast cancer genetic susceptibility     Cancer ([address]) 1994    reoccurence in the lymph nodes.     Cancer [facility]) 2008    left breast    Cancer [facility]) 1991    right breast    Hypertension      Past Surgical History:   Procedure Laterality Date    BREAST LUMPECTOMY  1994    right    BREAST LUMPECTOMY  2008    left breast    BREAST RECONSTRUCTION

## 2023-11-14 NOTE — PROGRESS NOTES
Chief Complaint   Patient presents with    Follow-up       1. \"Have you been to the ER, urgent care clinic since your last visit? Hospitalized since your last visit? \" nO    2. \"Have you seen or consulted any other health care providers outside of the [address] [place] [address] since your last visit? \" nO     3. For patients aged 43-73: Has the patient had a colonoscopy / FIT/ Cologuard? Yes      If the patient is female:    4. For patients aged 43-66: Has the patient had a mammogram within the past 2 years? N/a      5.  For patients aged 21-65: Has the patient had a pap smear? yES     Health Maintenance Due   Topic Date Due    HIV screen  Never done    Cervical cancer screen  01/20/2011    DTaP/Tdap/Td vaccine (2 - Td or Tdap) 05/20/2023    Flu vaccine (1) 08/01/2023    COVID-19 Vaccine (4 - 2023-24 season) 09/01/2023

## 2023-12-26 DIAGNOSIS — R45.86 MOOD SWINGS: ICD-10-CM

## 2023-12-27 RX ORDER — ESCITALOPRAM OXALATE 10 MG/1
20 TABLET ORAL DAILY
Qty: 60 TABLET | Refills: 5 | Status: SHIPPED | OUTPATIENT
Start: 2023-12-27

## 2024-03-20 ENCOUNTER — OFFICE VISIT (OUTPATIENT)
Age: 64
End: 2024-03-20
Payer: COMMERCIAL

## 2024-03-20 VITALS
RESPIRATION RATE: 18 BRPM | TEMPERATURE: 97.8 F | HEART RATE: 82 BPM | DIASTOLIC BLOOD PRESSURE: 78 MMHG | WEIGHT: 200.2 LBS | OXYGEN SATURATION: 99 % | BODY MASS INDEX: 32.17 KG/M2 | HEIGHT: 66 IN | SYSTOLIC BLOOD PRESSURE: 131 MMHG

## 2024-03-20 DIAGNOSIS — R73.02 IMPAIRED GLUCOSE TOLERANCE (ORAL): ICD-10-CM

## 2024-03-20 DIAGNOSIS — E78.00 HYPERCHOLESTEROLEMIA: ICD-10-CM

## 2024-03-20 DIAGNOSIS — I10 ESSENTIAL (PRIMARY) HYPERTENSION: ICD-10-CM

## 2024-03-20 DIAGNOSIS — Z00.00 ROUTINE GENERAL MEDICAL EXAMINATION AT A HEALTH CARE FACILITY: Primary | ICD-10-CM

## 2024-03-20 DIAGNOSIS — Z11.4 SCREENING FOR HIV (HUMAN IMMUNODEFICIENCY VIRUS): ICD-10-CM

## 2024-03-20 DIAGNOSIS — Z79.899 ENCOUNTER FOR LONG-TERM (CURRENT) USE OF MEDICATIONS: ICD-10-CM

## 2024-03-20 DIAGNOSIS — F43.21 ADJUSTMENT DISORDER WITH DEPRESSED MOOD: ICD-10-CM

## 2024-03-20 DIAGNOSIS — E66.9 NON MORBID OBESITY: ICD-10-CM

## 2024-03-20 PROCEDURE — 3078F DIAST BP <80 MM HG: CPT | Performed by: FAMILY MEDICINE

## 2024-03-20 PROCEDURE — 3075F SYST BP GE 130 - 139MM HG: CPT | Performed by: FAMILY MEDICINE

## 2024-03-20 PROCEDURE — 99396 PREV VISIT EST AGE 40-64: CPT | Performed by: FAMILY MEDICINE

## 2024-03-20 ASSESSMENT — PATIENT HEALTH QUESTIONNAIRE - PHQ9
1. LITTLE INTEREST OR PLEASURE IN DOING THINGS: NOT AT ALL
SUM OF ALL RESPONSES TO PHQ9 QUESTIONS 1 & 2: 0
2. FEELING DOWN, DEPRESSED OR HOPELESS: NOT AT ALL
SUM OF ALL RESPONSES TO PHQ QUESTIONS 1-9: 0

## 2024-03-20 NOTE — PROGRESS NOTES
Chief Complaint   Patient presents with    Annual Exam       \"Have you been to the ER, urgent care clinic since your last visit?  Hospitalized since your last visit?\"    NO    “Have you seen or consulted any other health care providers outside of [facility] since your last visit?”    NO             Vitals:    24 1426   BP: 131/78   Pulse: 82   Resp: 18   Temp: 97.8 °F (36.6 °C)   SpO2: 99%       Health Maintenance Due   Topic Date Due    HIV screen  Never done    Respiratory Syncytial Virus (RSV) Pregnant or age 60 yrs+ (1 - 1-dose 60+ series) Never done    COVID-19 Vaccine ( season) 2023        The patient, Guadalupe Gaona, identity was verified by name and .

## 2024-03-20 NOTE — PROGRESS NOTES
Subjective:   Guadalupe Gaona is a 63 y.o. female for Well Woman Check.  No LMP recorded. Patient has had a hysterectomy.    Hypertension Review:  Compliant w/ low salt diet, and some exercise.  Tolerating Norvasc without problems.  No home bp monitoring.  No swelling, headache or dizziness.  No chest pain, SOB, palpitations.        Glucose intolerance reveiw:  She has IGT.  Last a1c level was at 5.5 %  Diabetic ROS - further diabetic ROS: no polyuria or polydipsia, no chest pain, dyspnea or TIA's, no numbness, tingling or pain in extremities, no unusual visual symptoms.   Lab review: orders written for new lab studies as appropriate; see orders.    Depression Review:  Patient is seen for followup of depression. Treatment includes Lexapro.  Ongoing symptoms include sress with water damge to her house ealeir on last year which she has not been able to live in over the past several months.  She has started with moving back into her home.    She denies depressed mood and fatigue.   She experiences the following side effects from the treatment: none.    S/p b/l mastectomy for hx of breast cancer October 2015.   Colonoscopy 6/26/2020 by Dr. Corral repeat in 10 years.     Patient Active Problem List   Diagnosis    Breast cancer genetic susceptibility    HX: breast cancer    S/P bilateral mastectomy    Osteoporosis    Insomnia    Encounter for medication monitoring    Hypertension    IGT (impaired glucose tolerance)    Hypovitaminosis D     Patient Active Problem List    Diagnosis Date Noted    S/P bilateral mastectomy 12/11/2017    Encounter for medication monitoring 11/10/2015    IGT (impaired glucose tolerance) 11/10/2015    HX: breast cancer 08/25/2014    Insomnia 10/25/2013    Hypertension     Osteoporosis 04/08/2013    Hypovitaminosis D 01/24/2011    Breast cancer genetic susceptibility      Current Outpatient Medications   Medication Sig Dispense Refill    Calcium-Vitamin D (CALTRATE 600 PLUS-VIT D PO)

## 2024-03-20 NOTE — PROGRESS NOTES
Subjective:   62 y.o. female for Well Woman Check.   Her gyne and breast care is done elsewhere by her Ob-Gyne physician.    Hypertension Review:  Compliant w/ low salt diet, and some exercise.  Tolerating Norvasc without problems.  No home bp monitoring.  No swelling, headache or dizziness.  No chest pain, SOB, palpitations.        Glucose intolerance reveiw:  She has IGT.  Last a1c level was at 5.5 %  Diabetic ROS - further diabetic ROS: no polyuria or polydipsia, no chest pain, dyspnea or TIA's, no numbness, tingling or pain in extremities, no unusual visual symptoms.   Lab review: orders written for new lab studies as appropriate; see orders.    Depression Review:  Patient is seen for followup of depression. Treatment includes Lexapro.  Ongoing symptoms include sress with water damge to her house ealeir on last year which she has not been able to live in over the past several months.  She has started with moving back into her home.    She denies depressed mood and fatigue.   She experiences the following side effects from the treatment: none.    S/p b/l mastectomy for hx of breast cancer October 2015.   Colonoscopy 6/26/2020 by Dr. Corral repeat in 10 years.     Patient Active Problem List   Diagnosis Code    Breast cancer genetic susceptibility Z15.01    Hypovitaminosis D E55.9    Osteoporosis M81.0    Hypertension I10    Insomnia G47.00    HX: breast cancer Z85.3    Encounter for medication monitoring Z51.81    IGT (impaired glucose tolerance) R73.02    S/P bilateral mastectomy Z90.13     Current Outpatient Medications   Medication Sig Dispense Refill    escitalopram oxalate (LEXAPRO) 10 mg tablet TAKE 1 TABLET BY MOUTH EVERY DAY 90 Tablet 3    amLODIPine (NORVASC) 5 mg tablet TAKE 1 TABLET BY MOUTH EVERY DAY 90 Tablet 3    acetaminophen/diphenhydramine (TYLENOL PM PO) Take 1 Tablet by mouth nightly as needed.      guaifenesin/phenylephrine HCl (MUCINEX COLD PO) Take 1 tab by mouth daily as needed

## 2024-03-21 LAB
ALBUMIN SERPL-MCNC: 4.6 G/DL (ref 3.9–4.9)
ALBUMIN/GLOB SERPL: 1.6 {RATIO} (ref 1.2–2.2)
ALP SERPL-CCNC: 86 IU/L (ref 44–121)
ALT SERPL-CCNC: 12 IU/L (ref 0–32)
APPEARANCE UR: CLEAR
AST SERPL-CCNC: 20 IU/L (ref 0–40)
BACTERIA #/AREA URNS HPF: NORMAL /[HPF]
BILIRUB SERPL-MCNC: 0.3 MG/DL (ref 0–1.2)
BILIRUB UR QL STRIP: NEGATIVE
BUN SERPL-MCNC: 10 MG/DL (ref 8–27)
BUN/CREAT SERPL: 13 (ref 12–28)
CALCIUM SERPL-MCNC: 9.7 MG/DL (ref 8.7–10.3)
CASTS URNS QL MICRO: NORMAL /LPF
CHLORIDE SERPL-SCNC: 101 MMOL/L (ref 96–106)
CHOLEST SERPL-MCNC: 226 MG/DL (ref 100–199)
CO2 SERPL-SCNC: 26 MMOL/L (ref 20–29)
COLOR UR: YELLOW
CREAT SERPL-MCNC: 0.76 MG/DL (ref 0.57–1)
EGFRCR SERPLBLD CKD-EPI 2021: 89 ML/MIN/1.73
EPI CELLS #/AREA URNS HPF: NORMAL /HPF (ref 0–10)
ERYTHROCYTE [DISTWIDTH] IN BLOOD BY AUTOMATED COUNT: 13.2 % (ref 11.7–15.4)
GLOBULIN SER CALC-MCNC: 2.8 G/DL (ref 1.5–4.5)
GLUCOSE SERPL-MCNC: 65 MG/DL (ref 70–99)
GLUCOSE UR QL STRIP: NEGATIVE
HBA1C MFR BLD: 6.1 % (ref 4.8–5.6)
HCT VFR BLD AUTO: 38.8 % (ref 34–46.6)
HDLC SERPL-MCNC: 101 MG/DL
HGB BLD-MCNC: 12.7 G/DL (ref 11.1–15.9)
HGB UR QL STRIP: NEGATIVE
IMP & REVIEW OF LAB RESULTS: NORMAL
KETONES UR QL STRIP: NEGATIVE
LDLC SERPL CALC-MCNC: 110 MG/DL (ref 0–99)
LEUKOCYTE ESTERASE UR QL STRIP: ABNORMAL
MCH RBC QN AUTO: 30 PG (ref 26.6–33)
MCHC RBC AUTO-ENTMCNC: 32.7 G/DL (ref 31.5–35.7)
MCV RBC AUTO: 92 FL (ref 79–97)
MICRO URNS: ABNORMAL
NITRITE UR QL STRIP: NEGATIVE
PH UR STRIP: 6.5 [PH] (ref 5–7.5)
PLATELET # BLD AUTO: 296 X10E3/UL (ref 150–450)
POTASSIUM SERPL-SCNC: 5.1 MMOL/L (ref 3.5–5.2)
PROT SERPL-MCNC: 7.4 G/DL (ref 6–8.5)
PROT UR QL STRIP: NEGATIVE
RBC # BLD AUTO: 4.24 X10E6/UL (ref 3.77–5.28)
RBC #/AREA URNS HPF: NORMAL /HPF (ref 0–2)
SODIUM SERPL-SCNC: 142 MMOL/L (ref 134–144)
SP GR UR STRIP: 1.02 (ref 1–1.03)
TRIGL SERPL-MCNC: 91 MG/DL (ref 0–149)
UROBILINOGEN UR STRIP-MCNC: 1 MG/DL (ref 0.2–1)
VLDLC SERPL CALC-MCNC: 15 MG/DL (ref 5–40)
WBC # BLD AUTO: 5.3 X10E3/UL (ref 3.4–10.8)
WBC #/AREA URNS HPF: NORMAL /HPF (ref 0–5)

## 2024-03-22 LAB — HIV 1+2 AB+HIV1 P24 AG SERPL QL IA: NON REACTIVE

## 2024-05-03 DIAGNOSIS — G89.29 CHRONIC LEFT-SIDED LOW BACK PAIN WITH LEFT-SIDED SCIATICA: ICD-10-CM

## 2024-05-03 DIAGNOSIS — M54.42 CHRONIC LEFT-SIDED LOW BACK PAIN WITH LEFT-SIDED SCIATICA: ICD-10-CM

## 2024-05-03 DIAGNOSIS — I10 ESSENTIAL (PRIMARY) HYPERTENSION: ICD-10-CM

## 2024-05-03 RX ORDER — AMLODIPINE BESYLATE 5 MG/1
5 TABLET ORAL DAILY
Qty: 30 TABLET | Refills: 0 | Status: SHIPPED | OUTPATIENT
Start: 2024-05-03

## 2024-05-03 RX ORDER — METHOCARBAMOL 500 MG/1
500 TABLET, FILM COATED ORAL NIGHTLY PRN
Qty: 30 TABLET | Refills: 0 | Status: SHIPPED | OUTPATIENT
Start: 2024-05-03

## 2024-05-03 NOTE — TELEPHONE ENCOUNTER
Last appointment: 3/20/24  Next appointment: Advised to follow-up 9/20/24  Previous refill encounter(s): 10/23/23 #30    Requested Prescriptions     Pending Prescriptions Disp Refills    methocarbamol (ROBAXIN) 500 MG tablet 30 tablet 0     Sig: Take 1 tablet by mouth nightly as needed (back pain and spasms)         For Pharmacy Admin Tracking Only    Program: Medication Refill  CPA in place:    Recommendation Provided To:   Intervention Detail: New Rx: 1, reason: Patient Preference  Intervention Accepted By:   Gap Closed?:    Time Spent (min): 5

## 2024-05-03 NOTE — TELEPHONE ENCOUNTER
Last appointment: 3/20/24  Next appointment: 9/20/24  Previous refill encounter(s): 7/19/23 #90 with 3 refills    Requested Prescriptions     Pending Prescriptions Disp Refills    amLODIPine (NORVASC) 5 MG tablet 30 tablet 0     Sig: Take 1 tablet by mouth daily         For Pharmacy Admin Tracking Only    Program: Medication Refill  CPA in place:    Recommendation Provided To:   Intervention Detail: New Rx: 1, reason: Patient Preference  Intervention Accepted By:   Gap Closed?:    Time Spent (min): 5

## 2024-06-11 DIAGNOSIS — R45.86 MOOD SWINGS: ICD-10-CM

## 2024-06-12 RX ORDER — ESCITALOPRAM OXALATE 10 MG/1
20 TABLET ORAL DAILY
Qty: 180 TABLET | Refills: 3 | Status: SHIPPED | OUTPATIENT
Start: 2024-06-12

## 2024-06-12 NOTE — TELEPHONE ENCOUNTER
Pharmacy is requesting a 90 d/s    Last appointment: 3/20/24  Next appointment: 9/20/24  Previous refill encounter(s): 12/27/23 #60 with 5 refills    Requested Prescriptions     Pending Prescriptions Disp Refills    escitalopram (LEXAPRO) 10 MG tablet [Pharmacy Med Name: ESCITALOPRAM 10 MG TABLET] 180 tablet 3     Sig: TAKE 2 TABLETS BY MOUTH EVERY DAY         For Pharmacy Admin Tracking Only    Program: Medication Refill  CPA in place:    Recommendation Provided To:   Intervention Detail: New Rx: 1, reason: Patient Preference  Intervention Accepted By:   Gap Closed?:    Time Spent (min): 5

## 2024-08-27 DIAGNOSIS — I10 ESSENTIAL (PRIMARY) HYPERTENSION: ICD-10-CM

## 2024-08-27 RX ORDER — AMLODIPINE BESYLATE 5 MG/1
5 TABLET ORAL DAILY
Qty: 90 TABLET | Refills: 3 | Status: SHIPPED | OUTPATIENT
Start: 2024-08-27

## 2024-08-27 NOTE — TELEPHONE ENCOUNTER
Last appointment: 3/20/24  Next appointment: 9/20/24  Previous refill encounter(s): 5/3/24 #30    Requested Prescriptions     Pending Prescriptions Disp Refills    amLODIPine (NORVASC) 5 MG tablet [Pharmacy Med Name: AMLODIPINE BESYLATE 5 MG TAB] 90 tablet 3     Sig: TAKE 1 TABLET BY MOUTH EVERY DAY         For Pharmacy Admin Tracking Only    Program: Medication Refill  CPA in place:    Recommendation Provided To:   Intervention Detail: New Rx: 1, reason: Patient Preference  Intervention Accepted By:   Gap Closed?:    Time Spent (min): 5

## 2024-09-20 ENCOUNTER — OFFICE VISIT (OUTPATIENT)
Age: 64
End: 2024-09-20
Payer: COMMERCIAL

## 2024-09-20 VITALS
OXYGEN SATURATION: 98 % | SYSTOLIC BLOOD PRESSURE: 136 MMHG | DIASTOLIC BLOOD PRESSURE: 84 MMHG | WEIGHT: 198.2 LBS | RESPIRATION RATE: 18 BRPM | HEIGHT: 66 IN | BODY MASS INDEX: 31.85 KG/M2 | TEMPERATURE: 98.3 F

## 2024-09-20 DIAGNOSIS — E78.00 HYPERCHOLESTEROLEMIA: ICD-10-CM

## 2024-09-20 DIAGNOSIS — F32.A MILD DEPRESSION: ICD-10-CM

## 2024-09-20 DIAGNOSIS — I10 ESSENTIAL (PRIMARY) HYPERTENSION: Primary | ICD-10-CM

## 2024-09-20 DIAGNOSIS — R73.02 IMPAIRED GLUCOSE TOLERANCE (ORAL): ICD-10-CM

## 2024-09-20 DIAGNOSIS — Z79.899 ENCOUNTER FOR LONG-TERM (CURRENT) USE OF MEDICATIONS: ICD-10-CM

## 2024-09-20 DIAGNOSIS — E66.9 NON MORBID OBESITY: ICD-10-CM

## 2024-09-20 LAB
GLUCOSE, POC: 81 MG/DL
HBA1C MFR BLD: 5.4 %

## 2024-09-20 PROCEDURE — 82962 GLUCOSE BLOOD TEST: CPT | Performed by: FAMILY MEDICINE

## 2024-09-20 PROCEDURE — 3075F SYST BP GE 130 - 139MM HG: CPT | Performed by: FAMILY MEDICINE

## 2024-09-20 PROCEDURE — 83036 HEMOGLOBIN GLYCOSYLATED A1C: CPT | Performed by: FAMILY MEDICINE

## 2024-09-20 PROCEDURE — 99214 OFFICE O/P EST MOD 30 MIN: CPT | Performed by: FAMILY MEDICINE

## 2024-09-20 PROCEDURE — 3079F DIAST BP 80-89 MM HG: CPT | Performed by: FAMILY MEDICINE

## 2024-09-20 RX ORDER — LOSARTAN POTASSIUM 50 MG/1
50 TABLET ORAL EVERY EVENING
Qty: 30 TABLET | Refills: 3 | Status: SHIPPED | OUTPATIENT
Start: 2024-09-20

## 2024-09-20 ASSESSMENT — ENCOUNTER SYMPTOMS
RHINORRHEA: 0
ABDOMINAL PAIN: 0
SHORTNESS OF BREATH: 0
CHEST TIGHTNESS: 0
CONSTIPATION: 0
COUGH: 0
BLOOD IN STOOL: 0

## 2024-09-20 ASSESSMENT — PATIENT HEALTH QUESTIONNAIRE - PHQ9
SUM OF ALL RESPONSES TO PHQ QUESTIONS 1-9: 0
SUM OF ALL RESPONSES TO PHQ QUESTIONS 1-9: 0
SUM OF ALL RESPONSES TO PHQ9 QUESTIONS 1 & 2: 0
SUM OF ALL RESPONSES TO PHQ QUESTIONS 1-9: 0
1. LITTLE INTEREST OR PLEASURE IN DOING THINGS: NOT AT ALL
SUM OF ALL RESPONSES TO PHQ QUESTIONS 1-9: 0
2. FEELING DOWN, DEPRESSED OR HOPELESS: NOT AT ALL

## 2024-09-24 RX ORDER — LOSARTAN POTASSIUM 50 MG/1
50 TABLET ORAL EVERY EVENING
Qty: 90 TABLET | Refills: 3 | Status: SHIPPED | OUTPATIENT
Start: 2024-09-24

## 2024-10-28 ENCOUNTER — OFFICE VISIT (OUTPATIENT)
Age: 64
End: 2024-10-28
Payer: COMMERCIAL

## 2024-10-28 VITALS
SYSTOLIC BLOOD PRESSURE: 147 MMHG | DIASTOLIC BLOOD PRESSURE: 90 MMHG | HEART RATE: 61 BPM | RESPIRATION RATE: 18 BRPM | TEMPERATURE: 98 F | BODY MASS INDEX: 32.14 KG/M2 | WEIGHT: 200 LBS | OXYGEN SATURATION: 98 % | HEIGHT: 66 IN

## 2024-10-28 DIAGNOSIS — I10 ESSENTIAL (PRIMARY) HYPERTENSION: Primary | ICD-10-CM

## 2024-10-28 DIAGNOSIS — F32.A MILD DEPRESSION: ICD-10-CM

## 2024-10-28 DIAGNOSIS — E66.9 NON MORBID OBESITY: ICD-10-CM

## 2024-10-28 DIAGNOSIS — Z79.899 ENCOUNTER FOR LONG-TERM (CURRENT) USE OF MEDICATIONS: ICD-10-CM

## 2024-10-28 DIAGNOSIS — R73.02 IMPAIRED GLUCOSE TOLERANCE (ORAL): ICD-10-CM

## 2024-10-28 DIAGNOSIS — E78.00 HYPERCHOLESTEROLEMIA: ICD-10-CM

## 2024-10-28 PROCEDURE — 99213 OFFICE O/P EST LOW 20 MIN: CPT | Performed by: FAMILY MEDICINE

## 2024-10-28 PROCEDURE — 3077F SYST BP >= 140 MM HG: CPT | Performed by: FAMILY MEDICINE

## 2024-10-28 PROCEDURE — 3080F DIAST BP >= 90 MM HG: CPT | Performed by: FAMILY MEDICINE

## 2024-10-28 RX ORDER — LOSARTAN POTASSIUM 50 MG/1
100 TABLET ORAL EVERY EVENING
Qty: 90 TABLET | Refills: 3
Start: 2024-10-28

## 2024-10-28 ASSESSMENT — PATIENT HEALTH QUESTIONNAIRE - PHQ9
1. LITTLE INTEREST OR PLEASURE IN DOING THINGS: NOT AT ALL
7. TROUBLE CONCENTRATING ON THINGS, SUCH AS READING THE NEWSPAPER OR WATCHING TELEVISION: NOT AT ALL
6. FEELING BAD ABOUT YOURSELF - OR THAT YOU ARE A FAILURE OR HAVE LET YOURSELF OR YOUR FAMILY DOWN: NOT AT ALL
2. FEELING DOWN, DEPRESSED OR HOPELESS: NOT AT ALL
9. THOUGHTS THAT YOU WOULD BE BETTER OFF DEAD, OR OF HURTING YOURSELF: NOT AT ALL
SUM OF ALL RESPONSES TO PHQ QUESTIONS 1-9: 1
8. MOVING OR SPEAKING SO SLOWLY THAT OTHER PEOPLE COULD HAVE NOTICED. OR THE OPPOSITE, BEING SO FIGETY OR RESTLESS THAT YOU HAVE BEEN MOVING AROUND A LOT MORE THAN USUAL: NOT AT ALL
SUM OF ALL RESPONSES TO PHQ QUESTIONS 1-9: 1
3. TROUBLE FALLING OR STAYING ASLEEP: NOT AT ALL
SUM OF ALL RESPONSES TO PHQ9 QUESTIONS 1 & 2: 0
5. POOR APPETITE OR OVEREATING: NOT AT ALL
4. FEELING TIRED OR HAVING LITTLE ENERGY: SEVERAL DAYS
SUM OF ALL RESPONSES TO PHQ QUESTIONS 1-9: 1
SUM OF ALL RESPONSES TO PHQ QUESTIONS 1-9: 1

## 2024-10-28 ASSESSMENT — ENCOUNTER SYMPTOMS
BLOOD IN STOOL: 0
CHEST TIGHTNESS: 0
ABDOMINAL PAIN: 0
COUGH: 0
SHORTNESS OF BREATH: 0
CONSTIPATION: 0
RHINORRHEA: 0

## 2024-10-28 NOTE — PROGRESS NOTES
Subjective:      Patient ID: Guadalupe Gaona is a 64 y.o. female.    HPI  Follow up on chronic medical problems.  Overall feeling well.    Hypertension Review:  Compliant w/ low salt diet, and some exercise. Swelling is improved with stopping the norvasc.  Tolerating the losartan.    No swelling, headache or dizziness.  No chest pain, SOB, palpitations.        Glucose intolerance reveiw:  She has IGT.    Diabetic ROS - further diabetic ROS: no polyuria or polydipsia, no chest pain, dyspnea or TIA's, no numbness, tingling or pain in extremities, no unusual visual symptoms.   Lab review: orders written for new lab studies as appropriate; see orders.    Depression Review:  Patient is seen for followup of depression. Treatment includes Lexapro.  Ongoing symptoms include.  Increase stressors come and goes.   She experiences the following side effects from the treatment: none.    S/p b/l mastectomy for hx of breast cancer October 2015.   Colonoscopy 6/26/2020 by Dr. Corral repeat in 10 years.     Past Medical History:   Diagnosis Date    Breast cancer genetic susceptibility     Cancer (HCC) 1994    reoccurence in the lymph nodes.    Cancer (HCC) 2008    left breast    Cancer (HCC) 1991    right breast    Depression     Hypertension      Past Surgical History:   Procedure Laterality Date    BREAST LUMPECTOMY  1994    right    BREAST LUMPECTOMY  2008    left breast    BREAST RECONSTRUCTION  12/07/2016    completed    BREAST RECONSTRUCTION  10/5/2015    COLONOSCOPY  4/10    rub[facility].  repeat in5 yrs.    MASTECTOMY Bilateral 10/05/2015    OVARY REMOVAL  1/28/2013    STEM CELL TRNSPL AUTOLOGOUS  1994     Current Outpatient Medications   Medication Sig Dispense Refill    losartan (COZAAR) 50 MG tablet TAKE 1 TABLET BY MOUTH EVERY DAY IN THE EVENING 90 tablet 3    escitalopram (LEXAPRO) 10 MG tablet TAKE 2 TABLETS BY MOUTH EVERY  tablet 3    methocarbamol (ROBAXIN) 500 MG tablet Take 1 tablet by mouth nightly as needed

## 2024-10-28 NOTE — PROGRESS NOTES
Chief Complaint   Patient presents with    Follow-up     Patient is here today for a 1 month follow-up for BP       \"Have you been to the ER, urgent care clinic since your last visit?  Hospitalized since your last visit?\"    NO    “Have you seen or consulted any other health care providers outside of [facility] since your last visit?”    no        Vitals:    10/28/24 1457 10/28/24 1459   BP: (!) 153/93 (!) 147/90   Site: Left Upper Arm Right Upper Arm   Position: Sitting Sitting   Cuff Size: Large Adult Large Adult   Pulse: 61    Resp: 18    Temp: 98 °F (36.7 °C)    TempSrc: Oral    SpO2: 98%    Weight: 90.7 kg (200 lb)    Height: 1.664 m (5' 5.5\")         Click Here for Release of Records Request      There were no vitals filed for this visit.    There are no preventive care reminders to display for this patient.     The patient, Guadalupe Gaona, identity was verified by name and .

## 2024-10-30 LAB
BUN SERPL-MCNC: 12 MG/DL (ref 8–27)
BUN/CREAT SERPL: 14 (ref 12–28)
CALCIUM SERPL-MCNC: 9.4 MG/DL (ref 8.7–10.3)
CHLORIDE SERPL-SCNC: 99 MMOL/L (ref 96–106)
CO2 SERPL-SCNC: 25 MMOL/L (ref 20–29)
CREAT SERPL-MCNC: 0.88 MG/DL (ref 0.57–1)
EGFRCR SERPLBLD CKD-EPI 2021: 74 ML/MIN/1.73
GLUCOSE SERPL-MCNC: 70 MG/DL (ref 70–99)
POTASSIUM SERPL-SCNC: 4.8 MMOL/L (ref 3.5–5.2)
SODIUM SERPL-SCNC: 139 MMOL/L (ref 134–144)

## 2025-02-06 SDOH — ECONOMIC STABILITY: FOOD INSECURITY: WITHIN THE PAST 12 MONTHS, THE FOOD YOU BOUGHT JUST DIDN'T LAST AND YOU DIDN'T HAVE MONEY TO GET MORE.: NEVER TRUE

## 2025-02-06 SDOH — ECONOMIC STABILITY: TRANSPORTATION INSECURITY
IN THE PAST 12 MONTHS, HAS THE LACK OF TRANSPORTATION KEPT YOU FROM MEDICAL APPOINTMENTS OR FROM GETTING MEDICATIONS?: NO

## 2025-02-06 SDOH — ECONOMIC STABILITY: FOOD INSECURITY: WITHIN THE PAST 12 MONTHS, YOU WORRIED THAT YOUR FOOD WOULD RUN OUT BEFORE YOU GOT MONEY TO BUY MORE.: NEVER TRUE

## 2025-02-06 SDOH — ECONOMIC STABILITY: INCOME INSECURITY: IN THE LAST 12 MONTHS, WAS THERE A TIME WHEN YOU WERE NOT ABLE TO PAY THE MORTGAGE OR RENT ON TIME?: NO

## 2025-02-07 ENCOUNTER — OFFICE VISIT (OUTPATIENT)
Age: 65
End: 2025-02-07
Payer: COMMERCIAL

## 2025-02-07 VITALS
WEIGHT: 202.4 LBS | RESPIRATION RATE: 16 BRPM | OXYGEN SATURATION: 98 % | TEMPERATURE: 97.8 F | SYSTOLIC BLOOD PRESSURE: 134 MMHG | DIASTOLIC BLOOD PRESSURE: 80 MMHG | HEART RATE: 74 BPM | BODY MASS INDEX: 33.17 KG/M2

## 2025-02-07 DIAGNOSIS — E78.00 HYPERCHOLESTEROLEMIA: ICD-10-CM

## 2025-02-07 DIAGNOSIS — I10 ESSENTIAL (PRIMARY) HYPERTENSION: Primary | ICD-10-CM

## 2025-02-07 DIAGNOSIS — R73.02 IMPAIRED GLUCOSE TOLERANCE (ORAL): ICD-10-CM

## 2025-02-07 DIAGNOSIS — Z79.899 ENCOUNTER FOR LONG-TERM (CURRENT) USE OF MEDICATIONS: ICD-10-CM

## 2025-02-07 DIAGNOSIS — E66.9 NON MORBID OBESITY: ICD-10-CM

## 2025-02-07 DIAGNOSIS — F32.A MILD DEPRESSION: ICD-10-CM

## 2025-02-07 PROCEDURE — 3075F SYST BP GE 130 - 139MM HG: CPT | Performed by: FAMILY MEDICINE

## 2025-02-07 PROCEDURE — 3078F DIAST BP <80 MM HG: CPT | Performed by: FAMILY MEDICINE

## 2025-02-07 PROCEDURE — 99214 OFFICE O/P EST MOD 30 MIN: CPT | Performed by: FAMILY MEDICINE

## 2025-02-07 ASSESSMENT — ENCOUNTER SYMPTOMS
SHORTNESS OF BREATH: 0
RHINORRHEA: 0
ABDOMINAL PAIN: 0
COUGH: 0
BLOOD IN STOOL: 0
CHEST TIGHTNESS: 0
CONSTIPATION: 0

## 2025-02-07 ASSESSMENT — PATIENT HEALTH QUESTIONNAIRE - PHQ9
9. THOUGHTS THAT YOU WOULD BE BETTER OFF DEAD, OR OF HURTING YOURSELF: NOT AT ALL
8. MOVING OR SPEAKING SO SLOWLY THAT OTHER PEOPLE COULD HAVE NOTICED. OR THE OPPOSITE, BEING SO FIGETY OR RESTLESS THAT YOU HAVE BEEN MOVING AROUND A LOT MORE THAN USUAL: NOT AT ALL
5. POOR APPETITE OR OVEREATING: NEARLY EVERY DAY
SUM OF ALL RESPONSES TO PHQ9 QUESTIONS 1 & 2: 0
10. IF YOU CHECKED OFF ANY PROBLEMS, HOW DIFFICULT HAVE THESE PROBLEMS MADE IT FOR YOU TO DO YOUR WORK, TAKE CARE OF THINGS AT HOME, OR GET ALONG WITH OTHER PEOPLE: NOT DIFFICULT AT ALL
7. TROUBLE CONCENTRATING ON THINGS, SUCH AS READING THE NEWSPAPER OR WATCHING TELEVISION: NOT AT ALL
4. FEELING TIRED OR HAVING LITTLE ENERGY: NOT AT ALL
2. FEELING DOWN, DEPRESSED OR HOPELESS: NOT AT ALL
6. FEELING BAD ABOUT YOURSELF - OR THAT YOU ARE A FAILURE OR HAVE LET YOURSELF OR YOUR FAMILY DOWN: NOT AT ALL
1. LITTLE INTEREST OR PLEASURE IN DOING THINGS: NOT AT ALL
SUM OF ALL RESPONSES TO PHQ QUESTIONS 1-9: 3
SUM OF ALL RESPONSES TO PHQ QUESTIONS 1-9: 3
3. TROUBLE FALLING OR STAYING ASLEEP: NOT AT ALL
SUM OF ALL RESPONSES TO PHQ QUESTIONS 1-9: 3
SUM OF ALL RESPONSES TO PHQ QUESTIONS 1-9: 3

## 2025-02-07 NOTE — PROGRESS NOTES
Chief Complaint   Patient presents with    6 week follow up     Patient is here today for 6 week follow up for HTN.       \"Have you been to the ER, urgent care clinic since your last visit?  Hospitalized since your last visit?\"    NO    “Have you seen or consulted any other health care providers outside of [facility] since your last visit?”    NO            Click Here for Release of Records Request       There were no vitals filed for this visit.   Health Maintenance Due   Topic Date Due    Pneumococcal 50+ years Vaccine ( - PCV) Never done    COVID-19 Vaccine (2024- season) 2024        The patient, Guadalupe Gaona, identity was verified by name and .

## 2025-02-07 NOTE — PROGRESS NOTES
Subjective:      Patient ID: Guadalupe Gaona is a 64 y.o. female.    HPI  Follow up on chronic medical problems.  Overall feeling well.    Hypertension Review:  Compliant w/ low salt diet, and some exercise. Swelling is improved with stopping the norvasc.  Tolerating the losartan.    No swelling, headache or dizziness.  No chest pain, SOB, palpitations.        Glucose intolerance reveiw:  She has IGT.    Diabetic ROS - further diabetic ROS: no polyuria or polydipsia, no chest pain, dyspnea or TIA's, no numbness, tingling or pain in extremities, no unusual visual symptoms.   Lab review: orders written for new lab studies as appropriate; see orders.    Depression Review:  Patient is seen for followup of depression. Treatment includes Lexapro.  Ongoing symptoms include.  Increase stressors come and goes.   She experiences the following side effects from the treatment: none.    S/p b/l mastectomy for hx of breast cancer October 2015.   Colonoscopy 6/26/2020 by Dr. Corral repeat in 10 years.     Past Medical History:   Diagnosis Date    Breast cancer genetic susceptibility     Cancer (HCC) 1994    reoccurence in the lymph nodes.    Cancer (HCC) 2008    left breast    Cancer (HCC) 1991    right breast    Depression     Hypertension      Past Surgical History:   Procedure Laterality Date    BREAST LUMPECTOMY  1994    right    BREAST LUMPECTOMY  2008    left breast    BREAST RECONSTRUCTION  12/07/2016    completed    BREAST RECONSTRUCTION  10/5/2015    COLONOSCOPY  4/10    rubina.  repeat in5 yrs.    MASTECTOMY Bilateral 10/05/2015    OVARY REMOVAL  1/28/2013    STEM CELL TRNSPL AUTOLOGOUS  1994     Current Outpatient Medications   Medication Sig Dispense Refill    losartan (COZAAR) 50 MG tablet Take 2 tablets by mouth every evening 90 tablet 3    escitalopram (LEXAPRO) 10 MG tablet TAKE 2 TABLETS BY MOUTH EVERY  tablet 3    methocarbamol (ROBAXIN) 500 MG tablet Take 1 tablet by mouth nightly as needed (back pain and

## 2025-02-08 LAB
ALBUMIN SERPL-MCNC: 4.4 G/DL (ref 3.9–4.9)
ALP SERPL-CCNC: 84 IU/L (ref 44–121)
ALT SERPL-CCNC: 11 IU/L (ref 0–32)
AST SERPL-CCNC: 19 IU/L (ref 0–40)
BILIRUB SERPL-MCNC: <0.2 MG/DL (ref 0–1.2)
BUN SERPL-MCNC: 12 MG/DL (ref 8–27)
BUN/CREAT SERPL: 15 (ref 12–28)
CALCIUM SERPL-MCNC: 9.2 MG/DL (ref 8.7–10.3)
CHLORIDE SERPL-SCNC: 100 MMOL/L (ref 96–106)
CHOLEST SERPL-MCNC: 202 MG/DL (ref 100–199)
CO2 SERPL-SCNC: 24 MMOL/L (ref 20–29)
CREAT SERPL-MCNC: 0.81 MG/DL (ref 0.57–1)
EGFRCR SERPLBLD CKD-EPI 2021: 82 ML/MIN/1.73
ERYTHROCYTE [DISTWIDTH] IN BLOOD BY AUTOMATED COUNT: 13.2 % (ref 11.7–15.4)
GLOBULIN SER CALC-MCNC: 2.4 G/DL (ref 1.5–4.5)
GLUCOSE SERPL-MCNC: 63 MG/DL (ref 70–99)
HCT VFR BLD AUTO: 38.2 % (ref 34–46.6)
HDLC SERPL-MCNC: 79 MG/DL
HGB BLD-MCNC: 12.3 G/DL (ref 11.1–15.9)
IMP & REVIEW OF LAB RESULTS: NORMAL
LDLC SERPL CALC-MCNC: 109 MG/DL (ref 0–99)
MCH RBC QN AUTO: 29.8 PG (ref 26.6–33)
MCHC RBC AUTO-ENTMCNC: 32.2 G/DL (ref 31.5–35.7)
MCV RBC AUTO: 93 FL (ref 79–97)
PLATELET # BLD AUTO: 247 X10E3/UL (ref 150–450)
POTASSIUM SERPL-SCNC: 4.6 MMOL/L (ref 3.5–5.2)
PROT SERPL-MCNC: 6.8 G/DL (ref 6–8.5)
RBC # BLD AUTO: 4.13 X10E6/UL (ref 3.77–5.28)
SODIUM SERPL-SCNC: 140 MMOL/L (ref 134–144)
TRIGL SERPL-MCNC: 78 MG/DL (ref 0–149)
VLDLC SERPL CALC-MCNC: 14 MG/DL (ref 5–40)
WBC # BLD AUTO: 4.5 X10E3/UL (ref 3.4–10.8)

## 2025-05-28 DIAGNOSIS — I10 ESSENTIAL (PRIMARY) HYPERTENSION: ICD-10-CM

## 2025-05-29 RX ORDER — LOSARTAN POTASSIUM 50 MG/1
100 TABLET ORAL EVERY EVENING
Qty: 180 TABLET | Refills: 3 | Status: SHIPPED | OUTPATIENT
Start: 2025-05-29

## 2025-05-29 NOTE — TELEPHONE ENCOUNTER
Patient comment: Need prescription refill for taking 2 tablets daily instead of 1 tablet daily.     Last appointment: 2/7/25  Next appointment: 8/8/25  Previous refill encounter(s): 9/24/24    Requested Prescriptions     Pending Prescriptions Disp Refills    losartan (COZAAR) 50 MG tablet 180 tablet 3     Sig: Take 2 tablets by mouth every evening         For Pharmacy Admin Tracking Only    Program: Medication Refill  CPA in place:    Recommendation Provided To:   Intervention Detail: New Rx: 1, reason: Patient Preference  Intervention Accepted By:   Gap Closed?:    Time Spent (min): 5

## 2025-07-04 DIAGNOSIS — R45.86 MOOD SWINGS: ICD-10-CM

## 2025-07-07 RX ORDER — ESCITALOPRAM OXALATE 10 MG/1
20 TABLET ORAL DAILY
Qty: 180 TABLET | Refills: 3 | Status: SHIPPED | OUTPATIENT
Start: 2025-07-07

## 2025-07-07 NOTE — TELEPHONE ENCOUNTER
Last appointment: 2/7/25  Next appointment: 8/8/25  Previous refill encounter(s): 6/12/24    Requested Prescriptions     Pending Prescriptions Disp Refills    escitalopram (LEXAPRO) 10 MG tablet [Pharmacy Med Name: ESCITALOPRAM 10 MG TABLET] 180 tablet 3     Sig: TAKE 2 TABLETS BY MOUTH EVERY DAY         For Pharmacy Admin Tracking Only    Program: Medication Refill  CPA in place:    Recommendation Provided To:   Intervention Detail: New Rx: 1, reason: Patient Preference  Intervention Accepted By:   Gap Closed?:    Time Spent (min): 5

## 2025-07-14 ENCOUNTER — OFFICE VISIT (OUTPATIENT)
Age: 65
End: 2025-07-14
Payer: COMMERCIAL

## 2025-07-14 VITALS
HEIGHT: 66 IN | OXYGEN SATURATION: 97 % | DIASTOLIC BLOOD PRESSURE: 85 MMHG | BODY MASS INDEX: 31.98 KG/M2 | RESPIRATION RATE: 18 BRPM | HEART RATE: 68 BPM | TEMPERATURE: 97.8 F | SYSTOLIC BLOOD PRESSURE: 130 MMHG | WEIGHT: 199 LBS

## 2025-07-14 DIAGNOSIS — L03.113 CELLULITIS OF RIGHT WRIST: Primary | ICD-10-CM

## 2025-07-14 PROCEDURE — 99213 OFFICE O/P EST LOW 20 MIN: CPT | Performed by: FAMILY MEDICINE

## 2025-07-14 PROCEDURE — 3075F SYST BP GE 130 - 139MM HG: CPT | Performed by: FAMILY MEDICINE

## 2025-07-14 PROCEDURE — 3079F DIAST BP 80-89 MM HG: CPT | Performed by: FAMILY MEDICINE

## 2025-07-14 RX ORDER — CEPHALEXIN 500 MG/1
500 CAPSULE ORAL 3 TIMES DAILY
Qty: 30 CAPSULE | Refills: 0 | Status: SHIPPED | OUTPATIENT
Start: 2025-07-14 | End: 2025-07-24

## 2025-07-14 RX ORDER — PREDNISONE 10 MG/1
10 TABLET ORAL 2 TIMES DAILY
Qty: 10 TABLET | Refills: 0 | Status: SHIPPED | OUTPATIENT
Start: 2025-07-14 | End: 2025-07-19

## 2025-07-14 ASSESSMENT — PATIENT HEALTH QUESTIONNAIRE - PHQ9
7. TROUBLE CONCENTRATING ON THINGS, SUCH AS READING THE NEWSPAPER OR WATCHING TELEVISION: NOT AT ALL
3. TROUBLE FALLING OR STAYING ASLEEP: NOT AT ALL
6. FEELING BAD ABOUT YOURSELF - OR THAT YOU ARE A FAILURE OR HAVE LET YOURSELF OR YOUR FAMILY DOWN: NOT AT ALL
SUM OF ALL RESPONSES TO PHQ QUESTIONS 1-9: 0
5. POOR APPETITE OR OVEREATING: NOT AT ALL
9. THOUGHTS THAT YOU WOULD BE BETTER OFF DEAD, OR OF HURTING YOURSELF: NOT AT ALL
SUM OF ALL RESPONSES TO PHQ QUESTIONS 1-9: 0
10. IF YOU CHECKED OFF ANY PROBLEMS, HOW DIFFICULT HAVE THESE PROBLEMS MADE IT FOR YOU TO DO YOUR WORK, TAKE CARE OF THINGS AT HOME, OR GET ALONG WITH OTHER PEOPLE: NOT DIFFICULT AT ALL
4. FEELING TIRED OR HAVING LITTLE ENERGY: NOT AT ALL
2. FEELING DOWN, DEPRESSED OR HOPELESS: NOT AT ALL
8. MOVING OR SPEAKING SO SLOWLY THAT OTHER PEOPLE COULD HAVE NOTICED. OR THE OPPOSITE, BEING SO FIGETY OR RESTLESS THAT YOU HAVE BEEN MOVING AROUND A LOT MORE THAN USUAL: NOT AT ALL
1. LITTLE INTEREST OR PLEASURE IN DOING THINGS: NOT AT ALL

## 2025-07-14 NOTE — PROGRESS NOTES
Chief Complaint   Patient presents with    Joint Swelling     Patient presents with right wrist swelling has been present for a few days.       \"Have you been to the ER, urgent care clinic since your last visit?  Hospitalized since your last visit?\"    NO    “Have you seen or consulted any other health care providers outside of [facility] since your last visit?”    NO     “Have you had a pap smear?”    Scheduled for 2025    Date of last Cervical Cancer screen (HPV or PAP): 2011             Click Here for Release of Records Request      Vitals:    25 1532   BP: 130/85   Pulse: 68   Resp: 18   Temp: 97.8 °F (36.6 °C)   SpO2: 97%       Health Maintenance Due   Topic Date Due    Cervical cancer screen  2025        The patient, Guadalupe Gaona, identity was verified by name and .

## 2025-07-14 NOTE — PROGRESS NOTES
Subjective:      Patient ID: Guadalupe Gaona is a 65 y.o. female.    HPI  Right wrist swelling and redness in the past 2 days.  No injury.  Pain is 5/10.  Has been taking ibuprofen for the pain which does help.  Has been using ice.  No bites or injury to the area.  No hx of gout.  Increase pain with ROM.  Has some tingling sensation into the fingers.      Past Medical History:   Diagnosis Date    Breast cancer genetic susceptibility     Cancer (HCC) 1994    reoccurence in the lymph nodes.    Cancer (HCC) 2008    left breast    Cancer (HCC) 1991    right breast    Depression     Hypertension      Past Surgical History:   Procedure Laterality Date    BREAST LUMPECTOMY  1994    right    BREAST LUMPECTOMY  2008    left breast    BREAST RECONSTRUCTION  12/07/2016    completed    BREAST RECONSTRUCTION  10/5/2015    COLONOSCOPY  4/10    [facility].  repeat in5 yrs.    MASTECTOMY Bilateral 10/05/2015    OVARY REMOVAL  1/28/2013    STEM CELL TRNSPL AUTOLOGOUS  1994     Current Outpatient Medications   Medication Sig Dispense Refill    cephALEXin (KEFLEX) 500 MG capsule Take 1 capsule by mouth 3 times daily for 10 days 30 capsule 0    predniSONE (DELTASONE) 10 MG tablet Take 1 tablet by mouth 2 times daily for 5 days 10 tablet 0    escitalopram (LEXAPRO) 10 MG tablet TAKE 2 TABLETS BY MOUTH EVERY  tablet 3    losartan (COZAAR) 50 MG tablet Take 2 tablets by mouth every evening 180 tablet 3    methocarbamol (ROBAXIN) 500 MG tablet Take 1 tablet by mouth nightly as needed (back pain and spasms) 30 tablet 0    Calcium-Vitamin D (CALTRATE 600 PLUS-VIT D PO)       Vitamin E 400 UNIT/15ML LIQD Take 400 Units by mouth daily       No current facility-administered medications for this visit.      Family History   Problem Relation Age of Onset    Cancer Maternal Aunt     No Known Problems Paternal Grandfather     No Known Problems Paternal Grandmother     No Known Problems Maternal Grandfather     Heart Disease Mother     Anemia

## 2025-07-16 ENCOUNTER — OFFICE VISIT (OUTPATIENT)
Age: 65
End: 2025-07-16
Payer: COMMERCIAL

## 2025-07-16 VITALS
HEART RATE: 79 BPM | HEIGHT: 66 IN | WEIGHT: 197.4 LBS | BODY MASS INDEX: 31.72 KG/M2 | RESPIRATION RATE: 18 BRPM | TEMPERATURE: 97.7 F | OXYGEN SATURATION: 97 % | DIASTOLIC BLOOD PRESSURE: 86 MMHG | SYSTOLIC BLOOD PRESSURE: 132 MMHG

## 2025-07-16 DIAGNOSIS — L03.113 CELLULITIS OF RIGHT WRIST: Primary | ICD-10-CM

## 2025-07-16 PROCEDURE — 99212 OFFICE O/P EST SF 10 MIN: CPT | Performed by: FAMILY MEDICINE

## 2025-07-16 PROCEDURE — 3075F SYST BP GE 130 - 139MM HG: CPT | Performed by: FAMILY MEDICINE

## 2025-07-16 PROCEDURE — 3079F DIAST BP 80-89 MM HG: CPT | Performed by: FAMILY MEDICINE

## 2025-07-16 NOTE — PROGRESS NOTES
Subjective:      Patient ID: Guadalupe Gaona is a 65 y.o. female.    HPI  Follow up on cellulitis of the wrist.  Swelling and pain is improved.  Taking prednisone and keflex.     Past Medical History:   Diagnosis Date    Breast cancer genetic susceptibility     Cancer (HCC) 1994    reoccurence in the lymph nodes.    Cancer (HCC) 2008    left breast    Cancer (HCC) 1991    right breast    Depression     Hypertension      Past Surgical History:   Procedure Laterality Date    BREAST LUMPECTOMY  1994    right    BREAST LUMPECTOMY  2008    left breast    BREAST RECONSTRUCTION  12/07/2016    completed    BREAST RECONSTRUCTION  10/5/2015    COLONOSCOPY  4/10    manetas.  repeat in5 yrs.    MASTECTOMY Bilateral 10/05/2015    OVARY REMOVAL  1/28/2013    STEM CELL TRNSPL AUTOLOGOUS  1994     Current Outpatient Medications   Medication Sig Dispense Refill    cephALEXin (KEFLEX) 500 MG capsule Take 1 capsule by mouth 3 times daily for 10 days 30 capsule 0    predniSONE (DELTASONE) 10 MG tablet Take 1 tablet by mouth 2 times daily for 5 days 10 tablet 0    escitalopram (LEXAPRO) 10 MG tablet TAKE 2 TABLETS BY MOUTH EVERY  tablet 3    losartan (COZAAR) 50 MG tablet Take 2 tablets by mouth every evening 180 tablet 3    methocarbamol (ROBAXIN) 500 MG tablet Take 1 tablet by mouth nightly as needed (back pain and spasms) 30 tablet 0    Calcium-Vitamin D (CALTRATE 600 PLUS-VIT D PO)       Vitamin E 400 UNIT/15ML LIQD Take 400 Units by mouth daily       No current facility-administered medications for this visit.      Family History   Problem Relation Age of Onset    Cancer Maternal Aunt     No Known Problems Paternal Grandfather     No Known Problems Paternal Grandmother     No Known Problems Maternal Grandfather     Heart Disease Mother     Anemia Mother     Osteoarthritis Mother     Hypertension Mother     High Blood Pressure Mother     No Known Problems Father     Diabetes Maternal Grandmother     Cancer Maternal Cousin

## 2025-07-16 NOTE — PROGRESS NOTES
Chief Complaint   Patient presents with    Follow-up     Patient is here today for a 2 day follow-up swelling to left hand       \"Have you been to the ER, urgent care clinic since your last visit?  Hospitalized since your last visit?\"    NO    “Have you seen or consulted any other health care providers outside of [facility] since your last visit?”    NO     “Have you had a pap smear?”    NO    Date of last Cervical Cancer screen (HPV or PAP): 2011       Vitals:    25 1243 25 1246   BP: (!) 145/92 132/86   BP Site: Left Upper Arm Left Upper Arm   Patient Position: Sitting Sitting   BP Cuff Size: Large Adult Large Adult   Pulse: 79    Resp: 18    Temp: 97.7 °F (36.5 °C)    TempSrc: Temporal    SpO2: 97%    Weight: 89.5 kg (197 lb 6.4 oz)    Height: 1.664 m (5' 5.5\")           Click Here for Release of Records Request      There were no vitals filed for this visit.    Health Maintenance Due   Topic Date Due    Cervical cancer screen  2025        The patient, Guadalupe Gaona, identity was verified by name and .

## 2025-08-04 DIAGNOSIS — M54.42 CHRONIC LEFT-SIDED LOW BACK PAIN WITH LEFT-SIDED SCIATICA: ICD-10-CM

## 2025-08-04 DIAGNOSIS — G89.29 CHRONIC LEFT-SIDED LOW BACK PAIN WITH LEFT-SIDED SCIATICA: ICD-10-CM

## 2025-08-06 RX ORDER — METHOCARBAMOL 500 MG/1
500 TABLET, FILM COATED ORAL NIGHTLY PRN
Qty: 30 TABLET | Refills: 0 | Status: SHIPPED | OUTPATIENT
Start: 2025-08-06

## 2025-08-08 ENCOUNTER — OFFICE VISIT (OUTPATIENT)
Age: 65
End: 2025-08-08
Payer: COMMERCIAL

## 2025-08-08 VITALS
HEART RATE: 73 BPM | WEIGHT: 197.2 LBS | DIASTOLIC BLOOD PRESSURE: 78 MMHG | OXYGEN SATURATION: 97 % | SYSTOLIC BLOOD PRESSURE: 128 MMHG | BODY MASS INDEX: 32.32 KG/M2 | TEMPERATURE: 98.4 F | RESPIRATION RATE: 20 BRPM

## 2025-08-08 DIAGNOSIS — R73.02 IMPAIRED GLUCOSE TOLERANCE (ORAL): ICD-10-CM

## 2025-08-08 DIAGNOSIS — E78.00 HYPERCHOLESTEROLEMIA: ICD-10-CM

## 2025-08-08 DIAGNOSIS — Z79.899 ENCOUNTER FOR LONG-TERM (CURRENT) USE OF MEDICATIONS: ICD-10-CM

## 2025-08-08 DIAGNOSIS — F32.A MILD DEPRESSION: ICD-10-CM

## 2025-08-08 DIAGNOSIS — I10 ESSENTIAL (PRIMARY) HYPERTENSION: Primary | ICD-10-CM

## 2025-08-08 DIAGNOSIS — E66.9 NON MORBID OBESITY: ICD-10-CM

## 2025-08-08 LAB
GLUCOSE, POC: 80 MG/DL
HBA1C MFR BLD: 5.8 %

## 2025-08-08 PROCEDURE — 3074F SYST BP LT 130 MM HG: CPT | Performed by: FAMILY MEDICINE

## 2025-08-08 PROCEDURE — 82962 GLUCOSE BLOOD TEST: CPT | Performed by: FAMILY MEDICINE

## 2025-08-08 PROCEDURE — 99214 OFFICE O/P EST MOD 30 MIN: CPT | Performed by: FAMILY MEDICINE

## 2025-08-08 PROCEDURE — 3078F DIAST BP <80 MM HG: CPT | Performed by: FAMILY MEDICINE

## 2025-08-08 PROCEDURE — 83036 HEMOGLOBIN GLYCOSYLATED A1C: CPT | Performed by: FAMILY MEDICINE

## 2025-08-08 ASSESSMENT — PATIENT HEALTH QUESTIONNAIRE - PHQ9
SUM OF ALL RESPONSES TO PHQ QUESTIONS 1-9: 0
SUM OF ALL RESPONSES TO PHQ QUESTIONS 1-9: 0
7. TROUBLE CONCENTRATING ON THINGS, SUCH AS READING THE NEWSPAPER OR WATCHING TELEVISION: NOT AT ALL
3. TROUBLE FALLING OR STAYING ASLEEP: NOT AT ALL
4. FEELING TIRED OR HAVING LITTLE ENERGY: NOT AT ALL
10. IF YOU CHECKED OFF ANY PROBLEMS, HOW DIFFICULT HAVE THESE PROBLEMS MADE IT FOR YOU TO DO YOUR WORK, TAKE CARE OF THINGS AT HOME, OR GET ALONG WITH OTHER PEOPLE: NOT DIFFICULT AT ALL
SUM OF ALL RESPONSES TO PHQ QUESTIONS 1-9: 0
5. POOR APPETITE OR OVEREATING: NOT AT ALL
9. THOUGHTS THAT YOU WOULD BE BETTER OFF DEAD, OR OF HURTING YOURSELF: NOT AT ALL
1. LITTLE INTEREST OR PLEASURE IN DOING THINGS: NOT AT ALL
SUM OF ALL RESPONSES TO PHQ QUESTIONS 1-9: 0
6. FEELING BAD ABOUT YOURSELF - OR THAT YOU ARE A FAILURE OR HAVE LET YOURSELF OR YOUR FAMILY DOWN: NOT AT ALL
2. FEELING DOWN, DEPRESSED OR HOPELESS: NOT AT ALL
8. MOVING OR SPEAKING SO SLOWLY THAT OTHER PEOPLE COULD HAVE NOTICED. OR THE OPPOSITE, BEING SO FIGETY OR RESTLESS THAT YOU HAVE BEEN MOVING AROUND A LOT MORE THAN USUAL: NOT AT ALL

## 2025-08-08 ASSESSMENT — ENCOUNTER SYMPTOMS
BLOOD IN STOOL: 0
CHEST TIGHTNESS: 0
CONSTIPATION: 0
ABDOMINAL PAIN: 0
COUGH: 0
RHINORRHEA: 0
SHORTNESS OF BREATH: 0